# Patient Record
Sex: FEMALE | Employment: OTHER | URBAN - METROPOLITAN AREA
[De-identification: names, ages, dates, MRNs, and addresses within clinical notes are randomized per-mention and may not be internally consistent; named-entity substitution may affect disease eponyms.]

---

## 2024-03-04 ENCOUNTER — TRANSFERRED RECORDS (OUTPATIENT)
Dept: HEALTH INFORMATION MANAGEMENT | Facility: CLINIC | Age: 49
End: 2024-03-04

## 2024-08-19 ENCOUNTER — TRANSFERRED RECORDS (OUTPATIENT)
Dept: HEALTH INFORMATION MANAGEMENT | Facility: CLINIC | Age: 49
End: 2024-08-19

## 2024-08-20 ENCOUNTER — TRANSCRIBE ORDERS (OUTPATIENT)
Dept: OTHER | Age: 49
End: 2024-08-20

## 2024-08-20 DIAGNOSIS — R20.2 NUMBNESS AND TINGLING OF LEFT UPPER EXTREMITY: ICD-10-CM

## 2024-08-20 DIAGNOSIS — R20.2 FACIAL TINGLING: Primary | ICD-10-CM

## 2024-08-20 DIAGNOSIS — R20.0 NUMBNESS AND TINGLING OF LEFT UPPER EXTREMITY: ICD-10-CM

## 2024-09-04 ENCOUNTER — TELEPHONE (OUTPATIENT)
Dept: NEUROLOGY | Facility: CLINIC | Age: 49
End: 2024-09-04
Payer: COMMERCIAL

## 2024-09-04 NOTE — TELEPHONE ENCOUNTER
Message left for patient that the start time of appointment was moved by 30 mintues for October 31st with Dr. Robertson.  Patient will call back and confirm this message was received.    Patient appointment has already been adjusted- change was due to an error in the schedule.

## 2024-10-04 NOTE — CONFIDENTIAL NOTE
Reason for visit: Facial tingling     Numbness and tingling of left upper extremity    Referring Provider: Mara Crowder MBBS [facility]   Office Visit Notes: 08/19/2024         IMAGING   STATUS/LOCATION   DATE/TYPE   MRI/MRA [facility]- PACS 03/04/2024   CT/CTA N/A    LABS External - PACS    EEG     EMG     NEUOROPSYCH TEST:       NOTES:   STATUS/LOCATION   DATE/TYPE   Abdi Oquendo MD [facility]   [facility] [place]   01/30/2024, 03/08/2024

## 2024-10-31 ENCOUNTER — OFFICE VISIT (OUTPATIENT)
Dept: NEUROLOGY | Facility: CLINIC | Age: 49
End: 2024-10-31
Attending: INTERNAL MEDICINE
Payer: COMMERCIAL

## 2024-10-31 ENCOUNTER — PRE VISIT (OUTPATIENT)
Dept: NEUROLOGY | Facility: CLINIC | Age: 49
End: 2024-10-31

## 2024-10-31 VITALS
BODY MASS INDEX: 22.03 KG/M2 | HEART RATE: 83 BPM | HEIGHT: 66 IN | DIASTOLIC BLOOD PRESSURE: 74 MMHG | WEIGHT: 137.1 LBS | SYSTOLIC BLOOD PRESSURE: 111 MMHG | OXYGEN SATURATION: 100 %

## 2024-10-31 DIAGNOSIS — I63.9 CEREBROVASCULAR ACCIDENT (CVA), UNSPECIFIED MECHANISM (H): Primary | ICD-10-CM

## 2024-10-31 DIAGNOSIS — R20.2 NUMBNESS AND TINGLING OF LEFT UPPER EXTREMITY: ICD-10-CM

## 2024-10-31 DIAGNOSIS — R20.2 FACIAL TINGLING: ICD-10-CM

## 2024-10-31 DIAGNOSIS — R20.0 NUMBNESS AND TINGLING OF LEFT UPPER EXTREMITY: ICD-10-CM

## 2024-10-31 PROCEDURE — 99205 OFFICE O/P NEW HI 60 MIN: CPT | Performed by: PSYCHIATRY & NEUROLOGY

## 2024-10-31 RX ORDER — METFORMIN HYDROCHLORIDE 500 MG/1
500 TABLET, EXTENDED RELEASE ORAL
COMMUNITY
Start: 2024-08-19

## 2024-10-31 RX ORDER — ACETAMINOPHEN AND CODEINE PHOSPHATE 120; 12 MG/5ML; MG/5ML
0.35 SOLUTION ORAL DAILY
COMMUNITY
Start: 2023-11-06

## 2024-10-31 NOTE — PROGRESS NOTES
Neurology Consultation    Patient Name:  Chandra Lynn Reyer  MRN:  [id number]    :  1975  Date of Service:  2024  Primary care provider:  Mara Crowder        Chief Complaint: Facial numbness     History of Present Illness:     Chandra Lynn Reyer is a 49 year old right handed female who presents for evaluation of intermittent left sided numbness.     She got the Moderna booster in  and then immediately had numbness and tingling in the left side of her face and arm (received the booster in her left arm).  This then resolved but then started getting it intermittently. It got progressively worse over time and now has it most days.  It lasts a couple of minutes to 15 minutes on average.  She has not identified any provoking factor. Has not appreciated any particular time of day that it comes more often.  Feels this in the left side of her face, left arm, and left leg.  They don't always come in the same locations at the same time.  She appreciates more in her face though.  In her arm, she notices it from the elbow down to her wrist and in the leg more in the foot.  Denies associated pain or weakness.  Denies it limiting her activities.  Denies balance problems, falls, or dizziness associated. Has not identified any alleviating factors.  Symptoms are never on the right side. Thought massage therapy helped (per review of her PCP's note maybe 20-40%).       Denies miscarriages. Denies personal history of blood clots or TIAs.  Mom has had a TIAs. Maternal uncle passed away at age 60 - maybe had a blood clot - diabetes, transplants  - thought it was related to agent orange exposure. Has a history of ocular migraines - has been several years since she has had them.      Denies any significant events/episodes of neurologic episodes in the past.     Did not start ASA 81 mg.     Review of Records   - Was seen at the [facility]. Had MRI brain W/WO and MRI C-spine W/WO. Was recommended  "that she start ASA 81 mg for chronic infarcts.   - MRI brain W/WO was reviewed.  Official read:  No acute abnormality.  Chronic bilateral cerebellar lacunar infarcts.Non specific prominence of the supratentorial ventricles out of proportion to the sulci without periventricular hyperintensity to indicated acute hydrocephalus. 1.7 cm arachnoid cyst at the anterior left middle cranial fossa. Scattered foci of T2 prolongation throughout the subcortical white matter of both cerebral hemispheres mostly within the frontal lobes out of proportion to the patient's age.    - MRI C-spine W/WO: normal cervical spine signal. 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 Denies tobacco, EtOh use, and recreational drug use.     Allergies:  Not on File    Physical Exam:  /74   Pulse 83   Ht 1.684 m (5' 6.3\")   Wt 62.2 kg (137 lb 1.6 oz)   LMP  (Within Days)   SpO2 100%   BMI 21.93 kg/m      General:  No acute distress  Cardiovascular: Normal rate.  Lung: Respirations are non-labored.  Extremities: Normal range of motion  Integumentary: Warm and dry    Neurologic:  Mental status : alert, fund of knowledge appropriate for visit    LANGUAGE: Speech fluent, no dysarthria     CN:  II- pupils equal and reactive, visual fields full  III, IV, VI- extraocular movements intact  V- sensation intact bilaterally  VII- face symmetric  VIII- hearing intact, no nystagmus  IX, X- palate elevates symmetrically  XI- sternocleidomastoid 5/5  XII- tongue midline    MOTOR : intact bulk and tone  5/5 strength in all ext    SENSORY : intact to pp, temp, and vibration in BUE and BLE. Romberg negative      REFLEXES:       Right Left   Triceps 2+ 2+   Biceps 2+ 2+   Brachioradialis 2+ 2+   Knee jerk 2+ 2+   Ankle jerk 2+ 2+   Vaughn absent   Cross adductors absent "   Toes down going     CEREBELLUM: finger to nose, finger taps, and heel to shin intact bilaterally     GAIT : Largely normal ; able to do tandem gait     Cognition and Speech: Speech clear and coherent.    Psychiatric: Cooperative, Appropriate mood & affect.      Assessment/Plan:   Chandra Lynn Reyer is a 49 year old right handed female who presents for second opinion concerning intermittent left sided (face, arm, and leg) numbness.  Neurologic exam is largely unremarkable. Reviewed her MRI. Counseled patient that I did not see anything on her imaging,nor could I think of any neurologic disorder, that can readily explain her symptoms.  I am uncertain what further testing might provide an etiology. She does not really have pain associated so neuropathic pain agents unlikely to be that beneficial.      Her MRI does show a rather significant white matter disease burden, especially for her age and relatively few cardiovascular risk factors.  Also, surprisingly, she has evidence for bilateral chronic infarcts.  I do think further stroke workup is indicated especially with her age. Will obtain vascular imaging to start and will likely proceed with cardiac monitoring and TTE.  Discussed at length starting ASA 81 mg for secondary stroke prevention.  LDL 69 and A1c 6.3%.      Incidentally her MRI also shows ventriculomegaly.  No symptoms or exam findings suggestive of NPH.      Intermittent left sided numbness/tingling   Incidental bilateral cerebellar infarcts     Plan  > CTA H and N   > Start ASA 81 mg   > Zio patch and TTE pending CTA H and N  > Follow-up in 3 months      I spent 75 minutes providing care for this patient, including reviewing imaging, labs, and notes as well as providing counseling and coordination of care for the above issues.

## 2024-12-29 ENCOUNTER — HEALTH MAINTENANCE LETTER (OUTPATIENT)
Age: 49
End: 2024-12-29

## 2025-02-04 ENCOUNTER — TELEPHONE (OUTPATIENT)
Dept: NEUROLOGY | Facility: CLINIC | Age: 50
End: 2025-02-04
Payer: COMMERCIAL

## 2025-02-04 ENCOUNTER — HOSPITAL ENCOUNTER (OUTPATIENT)
Dept: CT IMAGING | Facility: CLINIC | Age: 50
Discharge: HOME | End: 2025-02-04
Attending: PSYCHIATRY & NEUROLOGY
Payer: COMMERCIAL

## 2025-02-04 DIAGNOSIS — I63.9 CEREBROVASCULAR ACCIDENT (CVA), UNSPECIFIED MECHANISM (H): ICD-10-CM

## 2025-02-04 LAB
CREAT BLD-MCNC: 0.6 MG/DL (ref 0.6–1.1)
EGFRCR SERPLBLD CKD-EPI 2021: >60 ML/MIN/1.73M2

## 2025-02-04 PROCEDURE — 70496 CT ANGIOGRAPHY HEAD: CPT

## 2025-02-04 PROCEDURE — 250N000011 HC RX IP 250 OP 636: Performed by: PSYCHIATRY & NEUROLOGY

## 2025-02-04 PROCEDURE — 82565 ASSAY OF CREATININE: CPT

## 2025-02-04 RX ORDER — IOPAMIDOL 755 MG/ML
75 INJECTION, SOLUTION INTRAVASCULAR ONCE
Status: COMPLETED | OUTPATIENT
Start: 2025-02-04 | End: 2025-02-04

## 2025-02-04 RX ADMIN — IOPAMIDOL 75 ML: 755 INJECTION, SOLUTION INTRAVENOUS at 14:29

## 2025-02-04 NOTE — TELEPHONE ENCOUNTER
Attempted to reach patient to remind them about appointment scheduled with Azalea Robertson DO on 2/5/25 in our [facility].    A voicemail was left with a call back number if the patient has questions or would like to reschedule.

## 2025-02-05 ENCOUNTER — OFFICE VISIT (OUTPATIENT)
Dept: NEUROLOGY | Facility: CLINIC | Age: 50
End: 2025-02-05
Payer: COMMERCIAL

## 2025-02-05 VITALS
SYSTOLIC BLOOD PRESSURE: 109 MMHG | DIASTOLIC BLOOD PRESSURE: 72 MMHG | OXYGEN SATURATION: 100 % | WEIGHT: 135.1 LBS | BODY MASS INDEX: 21.61 KG/M2 | HEART RATE: 77 BPM

## 2025-02-05 DIAGNOSIS — I67.1 INTRACRANIAL ANEURYSM: ICD-10-CM

## 2025-02-05 DIAGNOSIS — I63.9 CEREBROVASCULAR ACCIDENT (CVA), UNSPECIFIED MECHANISM (H): Primary | ICD-10-CM

## 2025-02-05 LAB — RADIOLOGIST FLAGS: ABNORMAL

## 2025-02-05 NOTE — LETTER
2025      Chandra L Reyer  [address] [place] [address]      Dear Colleague,    Thank you for referring your patient, Chandra L Reyer, to the [facility]. Please see a copy of my visit note below.      Neurology Consultation    Patient Name:  Chandra L Reyer  MRN:  [id number]    :  1975  Date of Service:  2025  Primary care provider:  Mara Crowder        Chief Complaint: Follow-up     History of Present Illness:     Chandra L Reyer is a 50 year old female who presents for routine follow-up.  Left sided numbness is largely the same.  She  has not started ASA.  Had questions about it.     Denies problems arthralgias, myalgias, swelling/erythematous joints, easy bruisability, easy dislocation of joints.     No family history of rheumatologic disorders that she is aware of. Denies family history of aneurysm.     Mom has diabetes type II, mom's side of the family has thyroid issues. Father passed away in his 50s from pulmonary scarring issues?  Paternal grandparents both passed away in 40s, maternal grandfather from a heart defect and grandmother from multiple myeloma    HEAD CT:  1.  No acute intracranial pathology.  2.  Small, chronic appearing infarction in the left cerebellar hemisphere     HEAD CTA:   1.  Multiple atypical appearing aneurysms regarding the anterior circulation as described. A referral to neuro interventional radiology for further workup is recommended. Additionally, given the number and appearance of these aneurysms, an underlying   connective tissue disease may explain the etiology. A referral to rheumatology is recommended as well.  2.  The left AICA is difficult to visualize past its proximal segment but may be thrombosed or is too diminutive to be visualized with a CT angiogram.     NECK CTA:  1.  Normal neck CTA.    Prior History from 10/31/2024:  She got the Moderna booster in  and then immediately had numbness and tingling  in the left side of her face and arm (received the booster in her left arm).  This then resolved but then started getting it intermittently. It got progressively worse over time and now has it most days.  It lasts a couple of minutes to 15 minutes on average.  She has not identified any provoking factor. Has not appreciated any particular time of day that it comes more often.  Feels this in the left side of her face, left arm, and left leg.  They don't always come in the same locations at the same time.  She appreciates more in her face though.  In her arm, she notices it from the elbow down to her wrist and in the leg more in the foot.  Denies associated pain or weakness.  Denies it limiting her activities.  Denies balance problems, falls, or dizziness associated. Has not identified any alleviating factors.  Symptoms are never on the right side. Thought massage therapy helped (per review of her PCP's note maybe 20-40%).        Denies miscarriages. Denies personal history of blood clots or TIAs.  Mom has had a TIAs. Maternal uncle passed away at age 60 - maybe had a blood clot - diabetes, transplants  - thought it was related to agent orange exposure. Has a history of ocular migraines - has been several years since she has had them.       Denies any significant events/episodes of neurologic episodes in the past.      Did not start ASA 81 mg.      Review of Records   - Was seen at the [facility]. Had MRI brain W/WO and MRI C-spine W/WO. Was recommended that she start ASA 81 mg for chronic infarcts.   - MRI brain W/WO was reviewed.  Official read:  No acute abnormality.  Chronic bilateral cerebellar lacunar infarcts.Non specific prominence of the supratentorial ventricles out of proportion to the sulci without periventricular hyperintensity to indicated acute hydrocephalus. 1.7 cm arachnoid cyst at the anterior left middle cranial fossa. Scattered foci of T2 prolongation throughout the subcortical  white matter of both cerebral hemispheres mostly within the frontal lobes out of proportion to the patient's age.    - MRI C-spine W/WO: normal cervical spine signal. At C5-6 mild intervertebral disc height loss and moderate right neural foraminal narrowing      Physical Exam:  /72   Pulse 77   Wt 61.3 kg (135 lb 1.6 oz)   SpO2 100%   BMI 21.61 kg/m    General:  No acute distress  Cardiovascular: Normal rate.  Lung: Respirations are non-labored.  Extremities: Normal range of motion  Integumentary: Warm and dry     Neurologic:  Mental status : alert, fund of knowledge appropriate for visit     LANGUAGE: Speech fluent, no dysarthria      CN:  II- pupils equal and reactive, visual fields full  III, IV, VI- extraocular movements intact  V- sensation intact bilaterally  VII- face symmetric  VIII- hearing intact, no nystagmus  IX, X- palate elevates symmetrically  XI- sternocleidomastoid 5/5  XII- tongue midline     MOTOR : intact bulk and tone  5/5 strength in all ext     SENSORY : intact to light touch in BUE and BLE      REFLEXES: Vaughn absent      CEREBELLUM: finger to nose, finger taps, and heel to shin intact bilaterally      GAIT : Largely normal      Cognition and Speech: Speech clear and coherent.     Psychiatric: Cooperative, Appropriate mood & affect.    Assessment/Plan:     Chandra L Reyer is a 50 year old year old female who presents for routine follow-up concerning intermittent left sided (face, arm, and leg) numbness.  Neurologic exam is largely unremarkable. Reviewed her MRIs. Counseled patient that I did not see anything on her imaging,nor could I think of any neurologic disorder, that can readily explain her symptoms.  I am uncertain what further testing might provide an etiology. She does not really have pain associated so neuropathic pain agents unlikely to be that beneficial.       Her MRI does show a rather significant white matter disease burden, especially for her age and relatively few  cardiovascular risk factors.  Also, surprisingly, she has evidence for bilateral chronic cerebellar infarcts.  Obtained CTA H and N for stroke workup which did not show significant LVO or stenosis; however, appreciated multiple intracranial aneurysms. Will refer to neuro IR for further evaluation and management. There was also some concern that the irregularity and amount of aneurysms was suggestive of a possible underlying connective tissue disorder. Nothing overt in patient's history is suggestive of this but think rheumatologic evaluation will be helpful. Will obtain TTE for stroke workup.  LDL 69 and A1c 6.3%.  Recommended ASA 81 mg for secondary stroke prevention.      Intermittent left sided numbness/tingling   Incidental bilateral cerebellar infarcts   Incidental multiple intracranial aneurysms      Plan  > Refer to neuro IR  > Refer to rheumatology   > TTE with bubble   > Start ASA 81 mg daily   > LDL at goal (40-70)  > Follow-up in 3 months      I spent 53 minutes providing care for this patient, including reviewing imaging, labs, and notes as well as providing counseling and coordination of care for the above issues.      Again, thank you for allowing me to participate in the care of your patient.        Sincerely,        Azalea Robertson, DO    Electronically signed

## 2025-02-05 NOTE — PATIENT INSTRUCTIONS
- I would like you to see the neuro interventional radiology for further evaluation of the aneurysms we found on your CT scan   - I'd also like you to see the rheumatologist to evaluate you for a connective tissue disorder that might predispose you to forming these aneurysms  - I have placed an order for an echocardiogram (ultrasound of the heart) for continued workup of the incidental strokes we saw on your MRI brain   - You should be called to schedule all these tests/referrals

## 2025-02-05 NOTE — PROGRESS NOTES
Neurology Consultation    Patient Name:  Chandra L Reyer  MRN:  [id number]    :  1975  Date of Service:  2025  Primary care provider:  Mara Crowder        Chief Complaint: Follow-up     History of Present Illness:     Chandra L Reyer is a 50 year old female who presents for routine follow-up.  Left sided numbness is largely the same.  She  has not started ASA.  Had questions about it.     Denies problems arthralgias, myalgias, swelling/erythematous joints, easy bruisability, easy dislocation of joints.     No family history of rheumatologic disorders that she is aware of. Denies family history of aneurysm.     Mom has diabetes type II, mom's side of the family has thyroid issues. Father passed away in his 50s from pulmonary scarring issues?  Paternal grandparents both passed away in 40s, maternal grandfather from a heart defect and grandmother from multiple myeloma    HEAD CT:  1.  No acute intracranial pathology.  2.  Small, chronic appearing infarction in the left cerebellar hemisphere     HEAD CTA:   1.  Multiple atypical appearing aneurysms regarding the anterior circulation as described. A referral to neuro interventional radiology for further workup is recommended. Additionally, given the number and appearance of these aneurysms, an underlying   connective tissue disease may explain the etiology. A referral to rheumatology is recommended as well.  2.  The left AICA is difficult to visualize past its proximal segment but may be thrombosed or is too diminutive to be visualized with a CT angiogram.     NECK CTA:  1.  Normal neck CTA.    Prior History from 10/31/2024:  She got the Moderna booster in  and then immediately had numbness and tingling in the left side of her face and arm (received the booster in her left arm).  This then resolved but then started getting it intermittently. It got progressively worse over time and now has it most days.  It lasts a couple of minutes to 15  minutes on average.  She has not identified any provoking factor. Has not appreciated any particular time of day that it comes more often.  Feels this in the left side of her face, left arm, and left leg.  They don't always come in the same locations at the same time.  She appreciates more in her face though.  In her arm, she notices it from the elbow down to her wrist and in the leg more in the foot.  Denies associated pain or weakness.  Denies it limiting her activities.  Denies balance problems, falls, or dizziness associated. Has not identified any alleviating factors.  Symptoms are never on the right side. Thought massage therapy helped (per review of her PCP's note maybe 20-40%).        Denies miscarriages. Denies personal history of blood clots or TIAs.  Mom has had a TIAs. Maternal uncle passed away at age 60 - maybe had a blood clot - diabetes, transplants  - thought it was related to agent orange exposure. Has a history of ocular migraines - has been several years since she has had them.       Denies any significant events/episodes of neurologic episodes in the past.      Did not start ASA 81 mg.      Review of Records   - Was seen at the [facility]. Had MRI brain W/WO and MRI C-spine W/WO. Was recommended that she start ASA 81 mg for chronic infarcts.   - MRI brain W/WO was reviewed.  Official read:  No acute abnormality.  Chronic bilateral cerebellar lacunar infarcts.Non specific prominence of the supratentorial ventricles out of proportion to the sulci without periventricular hyperintensity to indicated acute hydrocephalus. 1.7 cm arachnoid cyst at the anterior left middle cranial fossa. Scattered foci of T2 prolongation throughout the subcortical white matter of both cerebral hemispheres mostly within the frontal lobes out of proportion to the patient's age.    - MRI C-spine W/WO: normal cervical spine signal. At C5-6 mild intervertebral disc height loss and moderate right neural  foraminal narrowing      Physical Exam:  /72   Pulse 77   Wt 61.3 kg (135 lb 1.6 oz)   SpO2 100%   BMI 21.61 kg/m    General:  No acute distress  Cardiovascular: Normal rate.  Lung: Respirations are non-labored.  Extremities: Normal range of motion  Integumentary: Warm and dry     Neurologic:  Mental status : alert, fund of knowledge appropriate for visit     LANGUAGE: Speech fluent, no dysarthria      CN:  II- pupils equal and reactive, visual fields full  III, IV, VI- extraocular movements intact  V- sensation intact bilaterally  VII- face symmetric  VIII- hearing intact, no nystagmus  IX, X- palate elevates symmetrically  XI- sternocleidomastoid 5/5  XII- tongue midline     MOTOR : intact bulk and tone  5/5 strength in all ext     SENSORY : intact to light touch in BUE and BLE      REFLEXES: Vaughn absent      CEREBELLUM: finger to nose, finger taps, and heel to shin intact bilaterally      GAIT : Largely normal      Cognition and Speech: Speech clear and coherent.     Psychiatric: Cooperative, Appropriate mood & affect.    Assessment/Plan:     Chandra L Reyer is a 50 year old year old female who presents for routine follow-up concerning intermittent left sided (face, arm, and leg) numbness.  Neurologic exam is largely unremarkable. Reviewed her MRIs. Counseled patient that I did not see anything on her imaging,nor could I think of any neurologic disorder, that can readily explain her symptoms.  I am uncertain what further testing might provide an etiology. She does not really have pain associated so neuropathic pain agents unlikely to be that beneficial.       Her MRI does show a rather significant white matter disease burden, especially for her age and relatively few cardiovascular risk factors.  Also, surprisingly, she has evidence for bilateral chronic cerebellar infarcts.  Obtained CTA H and N for stroke workup which did not show significant LVO or stenosis; however, appreciated multiple  intracranial aneurysms. Will refer to neuro IR for further evaluation and management. There was also some concern that the irregularity and amount of aneurysms was suggestive of a possible underlying connective tissue disorder. Nothing overt in patient's history is suggestive of this but think rheumatologic evaluation will be helpful. Will obtain TTE for stroke workup.  LDL 69 and A1c 6.3%.  Recommended ASA 81 mg for secondary stroke prevention.      Intermittent left sided numbness/tingling   Incidental bilateral cerebellar infarcts   Incidental multiple intracranial aneurysms      Plan  > Refer to neuro IR  > Refer to rheumatology   > TTE with bubble   > Start ASA 81 mg daily   > LDL at goal (40-70)  > Follow-up in 3 months      I spent 53 minutes providing care for this patient, including reviewing imaging, labs, and notes as well as providing counseling and coordination of care for the above issues.

## 2025-02-07 ENCOUNTER — OFFICE VISIT (OUTPATIENT)
Dept: RHEUMATOLOGY | Facility: CLINIC | Age: 50
End: 2025-02-07
Attending: STUDENT IN AN ORGANIZED HEALTH CARE EDUCATION/TRAINING PROGRAM
Payer: COMMERCIAL

## 2025-02-07 ENCOUNTER — LAB (OUTPATIENT)
Dept: LAB | Facility: CLINIC | Age: 50
End: 2025-02-07
Payer: COMMERCIAL

## 2025-02-07 VITALS
BODY MASS INDEX: 21.35 KG/M2 | OXYGEN SATURATION: 100 % | DIASTOLIC BLOOD PRESSURE: 79 MMHG | HEART RATE: 90 BPM | SYSTOLIC BLOOD PRESSURE: 122 MMHG | WEIGHT: 133.5 LBS

## 2025-02-07 DIAGNOSIS — I73.00 RAYNAUD'S DISEASE WITHOUT GANGRENE: ICD-10-CM

## 2025-02-07 DIAGNOSIS — I73.00 RAYNAUD'S DISEASE WITHOUT GANGRENE: Primary | ICD-10-CM

## 2025-02-07 DIAGNOSIS — I67.1 INTRACRANIAL ANEURYSM: ICD-10-CM

## 2025-02-07 LAB
ALBUMIN MFR UR ELPH: 6.6 MG/DL
CREAT UR-MCNC: 95.1 MG/DL
CRP SERPL-MCNC: <3 MG/L
ERYTHROCYTE [SEDIMENTATION RATE] IN BLOOD BY WESTERGREN METHOD: 20 MM/HR (ref 0–30)
PROT/CREAT 24H UR: 0.07 MG/MG CR (ref 0–0.2)

## 2025-02-07 PROCEDURE — 86146 BETA-2 GLYCOPROTEIN ANTIBODY: CPT

## 2025-02-07 PROCEDURE — 86039 ANTINUCLEAR ANTIBODIES (ANA): CPT

## 2025-02-07 PROCEDURE — 83516 IMMUNOASSAY NONANTIBODY: CPT

## 2025-02-07 PROCEDURE — 84156 ASSAY OF PROTEIN URINE: CPT

## 2025-02-07 PROCEDURE — 86235 NUCLEAR ANTIGEN ANTIBODY: CPT

## 2025-02-07 PROCEDURE — 99213 OFFICE O/P EST LOW 20 MIN: CPT | Performed by: STUDENT IN AN ORGANIZED HEALTH CARE EDUCATION/TRAINING PROGRAM

## 2025-02-07 PROCEDURE — 82595 ASSAY OF CRYOGLOBULIN: CPT

## 2025-02-07 PROCEDURE — 86147 CARDIOLIPIN ANTIBODY EA IG: CPT

## 2025-02-07 PROCEDURE — 86235 NUCLEAR ANTIGEN ANTIBODY: CPT | Mod: 59

## 2025-02-07 PROCEDURE — 86225 DNA ANTIBODY NATIVE: CPT

## 2025-02-07 PROCEDURE — 99205 OFFICE O/P NEW HI 60 MIN: CPT | Performed by: STUDENT IN AN ORGANIZED HEALTH CARE EDUCATION/TRAINING PROGRAM

## 2025-02-07 PROCEDURE — 85613 RUSSELL VIPER VENOM DILUTED: CPT

## 2025-02-07 PROCEDURE — 85652 RBC SED RATE AUTOMATED: CPT

## 2025-02-07 PROCEDURE — 85390 FIBRINOLYSINS SCREEN I&R: CPT | Performed by: PATHOLOGY

## 2025-02-07 PROCEDURE — 86036 ANCA SCREEN EACH ANTIBODY: CPT

## 2025-02-07 PROCEDURE — 83876 ASSAY MYELOPEROXIDASE: CPT

## 2025-02-07 PROCEDURE — 85730 THROMBOPLASTIN TIME PARTIAL: CPT

## 2025-02-07 PROCEDURE — 86140 C-REACTIVE PROTEIN: CPT

## 2025-02-07 PROCEDURE — 86038 ANTINUCLEAR ANTIBODIES: CPT

## 2025-02-07 PROCEDURE — 86160 COMPLEMENT ANTIGEN: CPT

## 2025-02-07 PROCEDURE — G2211 COMPLEX E/M VISIT ADD ON: HCPCS | Performed by: STUDENT IN AN ORGANIZED HEALTH CARE EDUCATION/TRAINING PROGRAM

## 2025-02-07 ASSESSMENT — PAIN SCALES - GENERAL: PAINLEVEL_OUTOF10: NO PAIN (0)

## 2025-02-07 NOTE — PROGRESS NOTES
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  Comment: Findings are concerning for a possible vasculitis/vasculopathy. The remainder of her ROS is unremarkable except for some recurrent sinusitis and nasal polyps (no eosinophila or asthma to suggest EGPA, no other ENT symptoms, saddle nose,  respiratory symptoms, or renal dysfunction to suggest GPA). No constitutional symptoms. No signs or symptoms besides raynaud's to suggest scleroderma or SLE.       Plan:  -will obtain additional serologies as below  -CTA C/A/P      Orders Placed This Encounter   Procedures    CTA Chest Abdomen Pelvis w Contrast    ANCA IgG by IFA with Reflex to Titer    Complement C3    Complement C4    Cryoglobulin identification    Myeloperoxidase Antibody IgG    Proteinase 3 Antibody IgG    Anti Nuclear Sunni IgG by IFA with Reflex    Beta 2 Glycoprotein Antibodies IGG IGM    Cardiolipin Sunni IgG and IgM    Lupus Anticoagulant Panel    Erythrocyte sedimentation rate auto    CRP inflammation    Protein  random urine    DNA double stranded antibodies    YARA antibody panel    Scleroderma Antibody Scl70 YARA IgG    Centromere Antibody IgG    Mirna 1 Antibody IgG      The longitudinal plan of care for the diagnosis(es)/condition(s) as documented were addressed during this visit. Due to the added complexity in care, I will continue to support Hiram in the subsequent management and with ongoing continuity of care.    60 minutes spent on the day of the encounter doing chart review, history and exam, counseling and documentation.     Subjective         HPI    Patient presents for evaluation of possible underlying  autoimmune disorder to explain some neurological symptoms and abnormalities on brain imaging.    She reports that shortly after getting the Moderna booster (same day ) in December of 2021 she developed numbness and tingling over the left side of her face.  This way after a few hours but kept recurring and becoming more frequent and spread to include numbness and tingling over her left arm and sometimes over her left leg specifically over the knee and ankle.  These episodes became more frequent over the following year until they became daily in nature.  She reports they have not gotten any better or worse since.  No associated weakness.    Per review of most recent neurology note few days ago, neurological exam was largely unremarkable and per review of MRIs nothing was seen that could explain her symptoms.  CTA head and neck was done and was normal.  It was not felt that any known neurological disorder could explain her symptoms.  However, given the increased white matter disease burden significant for her age with relatively few cardiovascular risk factors as well as bilateral chronic cerebellar infarcts she was referred to neuro IR.  There is also some concern for irregularity and aneurysms of the vasculature and so she was referred to rheumatology for further workup of possible vascular disease related to systemic autoimmune disease.  She was started on aspirin 81 mg for secondary stroke prevention.    She will be getting an echocardiogram in the coming weeks.    Reports new Raynaud's which started last month. Has happened about 4 times after touching something cold. Had a few fingers that turned white for about 5-10 minutes.   No GERD, no dysphagia, has intermittent diarrhea and constipation, some bloating, no blood in stool.   No skin thickening or thightening.   No lumps or bumps.     No asthma, no nasal polyps.     Has a history of allergies and recurrent sinusitis since a teenager. No sinus procedures.  Doesn't usually get treated with antibiotics or steroids. No recurrent ear infections. Does get some ear fullness which she attributes to allergies, no hearing loss, no recurrent throat pain.   No recurrent unexplained fevers, but does get elevated temps to 99 with her sinus issues, no unintentional weight loss, no pain with breathing, no cough no SOB.  No rashes, no photosensitivity   No raynaud's, no oral or nasal ulcers,  No salivary gland swelling, no dry eyes or dry mouth except with allergies     No hx of inflammatory eye disease, no rashes,   no joint pain or swelling,   no hx of blood clots or miscarriages, (One pregnancy)  no muscle pain or weakness     No family  Hx of known autoimmune disease  Not a previous smoker. Not a current smoker.     Maternal grandmother with possible RA  Mom with DM2  Sister with thyroid disease    Lab and Imaging review:    I reviewed recent labs and imaging including:    EXAM: CTA HEAD NECK W CONTRAST  DATE: 2/4/2025     INDICATION: Incidental cerebellar strokes, stroke work up     COMPARISON: 03/04/2024        FINDINGS:      NONCONTRAST HEAD CT:   INTRACRANIAL CONTENTS: No intracranial hemorrhage, extraaxial collection, or mass effect.  No CT evidence of acute infarct. Chronic appearing small infarction within the superior aspect of the left cerebellar hemisphere Mild presumed chronic small vessel   ischemic changes. Given the patient's age, there is enlargement of the supratentorial ventricular system is disproportionate to the degree of volume loss. Correlate for normal pressure hydrocephalus.      VISUALIZED ORBITS/SINUSES/MASTOIDS: No intraorbital abnormality. Mild mucosal thickening scattered about the paranasal sinuses. No middle ear or mastoid effusion.     BONES/SOFT TISSUES: No acute abnormality.     HEAD CTA:     ANTERIOR CIRCULATION:      Multiple aneurysms arising from the intracranial internal carotid arteries as follows:     - Saccular type aneurysm arising from  the lateral aspect of the right cavernous internal carotid artery measuring approximately 2 mm (series 11, image 372).     - Saccular type aneurysm arising from the medial aspect of the right ophthalmic segment internal carotid artery with the neck measuring approximately 1.3 mm and the largest short axis diameter of the aneurysm itself measuring 3.1 mm (series 11, image   372).     -Anteriorly oriented aneurysm in close proximity to the takeoff of the left ophthalmic artery measuring 3.5 x 3.4 mm (series 11, 3 image 386)      Incidental anterior cerebral artery trifurcation. No large vessel occlusion or hemodynamically significant stenosis.     POSTERIOR CIRCULATION: Questionable though likely fenestration of the basilar artery (series 11, image 381). Balanced vertebral arteries supply a normal basilar artery. Diminutive AICA which is unable to be traced past its very proximal aspect (series   11, image 359-360).     DURAL VENOUS SINUSES: Expected enhancement of the major dural venous sinuses.     NECK CTA:     RIGHT CAROTID: No measurable stenosis or dissection.     LEFT CAROTID: No measurable stenosis or dissection.     VERTEBRAL ARTERIES: No focal stenosis or dissection. Balanced vertebral arteries.     AORTIC ARCH: Classic aortic arch anatomy with no significant stenosis at the origin of the great vessels.     NONVASCULAR STRUCTURES: Unremarkable.                                                                      IMPRESSION:   HEAD CT:  1.  No acute intracranial pathology.  2.  Small, chronic appearing infarction in the left cerebellar hemisphere     HEAD CTA:   1.  Multiple atypical appearing aneurysms regarding the anterior circulation as described. A referral to neuro interventional radiology for further workup is recommended. Additionally, given the number and appearance of these aneurysms, an underlying   connective tissue disease may explain the etiology. A referral to rheumatology is recommended as  well.  2.  The left AICA is difficult to visualize past its proximal segment but may be thrombosed or is too diminutive to be visualized with a CT angiogram.    MRI brain 3/4/2024          CTA head and neck 2/4/2025  MPRESSION:   HEAD CT:  1.  No acute intracranial pathology.  2.  Small, chronic appearing infarction in the left cerebellar hemisphere     HEAD CTA:   1.  Multiple atypical appearing aneurysms regarding the anterior circulation as described. A referral to neuro interventional radiology for further workup is recommended. Additionally, given the number and appearance of these aneurysms, an underlying   connective tissue disease may explain the etiology. A referral to rheumatology is recommended as well.  2.  The left AICA is difficult to visualize past its proximal segment but may be thrombosed or is too diminutive to be visualized with a CT angiogram.     NECK CTA:  1.  Normal neck CTA.    MRI cervical spine 3/4/2024    Normal signal intensity of the cervical spinal cord.  At C5-C6 mild intervertebral disc height loss and moderate right neural foraminal narrowing        Current Outpatient Medications:     loratadine-pseudoePHEDrine (CLARITIN-D 24-HOUR)  MG 24 hr tablet, Take 1 tablet by mouth daily., Disp: , Rfl:     metFORMIN (GLUCOPHAGE XR) 500 MG 24 hr tablet, Take 500 mg by mouth daily (with dinner)., Disp: , Rfl:     norethindrone (MICRONOR) 0.35 MG tablet, Take 0.35 mg by mouth daily., Disp: , Rfl:   Allergies:  No Known Allergies  Medical Hx:  No past medical history on file.  Surgical Hx:  No past surgical history on file.  Family Hx:  No family history on file.  Social Hx:  Social History     Tobacco Use    Smoking status: Never    Smokeless tobacco: Never   Substance Use Topics    Alcohol use: Not Currently        Objective   Physical Exam   /79 (BP Location: Right arm, Patient Position: Sitting, Cuff Size: Adult Large)   Pulse 90   Wt 60.6 kg (133 lb 8 oz)   SpO2 100%   BMI  21.35 kg/m    General: alert, well appearing, no distress  HEENT: no alopecia, clear conjunctiva, no saddlenose deformity, no oral or nasal ulcers, no cervical lymphadenopathy  Cardiac: normal rate and rhythm, no murmur, rubs or gallops   Pulm: normal respiratory effort, clear to auscultation bilaterally   MSK: Hand, wrist, elbow, and shoulder joints without evidence of synovitis or effusion. Intact and nonpainful range of motion. /ankle/knee/hip joints without erythema/effusion/tenderness to palpation and with intact nonpainful range of motion.   Skin: + Periungual erythema with dilated capillary loops, grossly visible.  Fingers cool to the touch. No nail pitting, digital ulceration, or telangiectasias. No subcutaneous nodules.  No Gutron's papules.  Dry skin over elbows.  No visible rashes.           Lisha Cevallos MD  Rheumatology

## 2025-02-07 NOTE — NURSING NOTE
Chief Complaint   Patient presents with    Consult     NEW PATIENT. Dx: Incidental finding on CTA Head of multiple irregular intracranial aneurysms seen, suggesting possible connective tissue disorder, referred by Azalea Robertson DO, per pt, MR in [facility].     /79 (BP Location: Right arm, Patient Position: Sitting, Cuff Size: Adult Large)   Pulse 90   Wt 60.6 kg (133 lb 8 oz)   SpO2 100%   BMI 21.35 kg/m

## 2025-02-07 NOTE — LETTER
2/7/2025       RE: Chandra L Reyer  [address] [place] [address]     Dear Colleague,    Thank you for referring your patient, Chandra L Reyer, to the [facility] RHEUMATOLOGY at [facility]. Please see a copy of my visit note below.    NEW PATIENT RHEUMATOLOGY VISIT     Assessment & Plan    Problem List    DMII  Alopecia accreta   Seasonal allergies       New onset Raynaud's with dilated capillary loops on exam   CTA Head with multiple aneurysms arising from the intracranial internal carotid arteries   MRI brain with scattered foci of hyperintensity within the frontal subcortical white matter over both hemispheres.  Comment: Findings are concerning for a possible vasculitis/vasculopathy. The remainder of her ROS is unremarkable except for some recurrent sinusitis and nasal polyps (no eosinophila or asthma to suggest EGPA, no other ENT symptoms, saddle nose,  respiratory symptoms, or renal dysfunction to suggest GPA). No constitutional symptoms. No signs or symptoms besides raynaud's to suggest scleroderma or SLE.       Plan:  -will obtain additional serologies as below  -CTA C/A/P      Orders Placed This Encounter   Procedures     CTA Chest Abdomen Pelvis w Contrast     ANCA IgG by IFA with Reflex to Titer     Complement C3     Complement C4     Cryoglobulin identification     Myeloperoxidase Antibody IgG     Proteinase 3 Antibody IgG     Anti Nuclear Sunni IgG by IFA with Reflex     Beta 2 Glycoprotein Antibodies IGG IGM     Cardiolipin Sunni IgG and IgM     Lupus Anticoagulant Panel     Erythrocyte sedimentation rate auto     CRP inflammation     Protein  random urine     DNA double stranded antibodies     YARA antibody panel     Scleroderma Antibody Scl70 YARA IgG     Centromere Antibody IgG     Mirna 1 Antibody IgG      The longitudinal plan of care for the diagnosis(es)/condition(s) as documented were addressed during this visit. Due to the added  complexity in care, I will continue to support Hiram in the subsequent management and with ongoing continuity of care.    60 minutes spent on the day of the encounter doing chart review, history and exam, counseling and documentation.     Subjective        HPI    Patient presents for evaluation of possible underlying autoimmune disorder to explain some neurological symptoms and abnormalities on brain imaging.    She reports that shortly after getting the Moderna booster (same day ) in December of 2021 she developed numbness and tingling over the left side of her face.  This way after a few hours but kept recurring and becoming more frequent and spread to include numbness and tingling over her left arm and sometimes over her left leg specifically over the knee and ankle.  These episodes became more frequent over the following year until they became daily in nature.  She reports they have not gotten any better or worse since.  No associated weakness.    Per review of most recent neurology note few days ago, neurological exam was largely unremarkable and per review of MRIs nothing was seen that could explain her symptoms.  CTA head and neck was done and was normal.  It was not felt that any known neurological disorder could explain her symptoms.  However, given the increased white matter disease burden significant for her age with relatively few cardiovascular risk factors as well as bilateral chronic cerebellar infarcts she was referred to neuro IR.  There is also some concern for irregularity and aneurysms of the vasculature and so she was referred to rheumatology for further workup of possible vascular disease related to systemic autoimmune disease.  She was started on aspirin 81 mg for secondary stroke prevention.    She will be getting an echocardiogram in the coming weeks.    Reports new Raynaud's which started last month. Has happened about 4 times after touching something cold. Had a few fingers that turned  white for about 5-10 minutes.   No GERD, no dysphagia, has intermittent diarrhea and constipation, some bloating, no blood in stool.   No skin thickening or thightening.   No lumps or bumps.     No asthma, no nasal polyps.     Has a history of allergies and recurrent sinusitis since a teenager. No sinus procedures. Doesn't usually get treated with antibiotics or steroids. No recurrent ear infections. Does get some ear fullness which she attributes to allergies, no hearing loss, no recurrent throat pain.   No recurrent unexplained fevers, but does get elevated temps to 99 with her sinus issues, no unintentional weight loss, no pain with breathing, no cough no SOB.  No rashes, no photosensitivity   No raynaud's, no oral or nasal ulcers,  No salivary gland swelling, no dry eyes or dry mouth except with allergies     No hx of inflammatory eye disease, no rashes,   no joint pain or swelling,   no hx of blood clots or miscarriages, (One pregnancy)  no muscle pain or weakness     No family  Hx of known autoimmune disease  Not a previous smoker. Not a current smoker.     Maternal grandmother with possible RA  Mom with DM2  Sister with thyroid disease    Lab and Imaging review:    I reviewed recent labs and imaging including:    EXAM: CTA HEAD NECK W CONTRAST  DATE: 2/4/2025     INDICATION: Incidental cerebellar strokes, stroke work up     COMPARISON: 03/04/2024        FINDINGS:      NONCONTRAST HEAD CT:   INTRACRANIAL CONTENTS: No intracranial hemorrhage, extraaxial collection, or mass effect.  No CT evidence of acute infarct. Chronic appearing small infarction within the superior aspect of the left cerebellar hemisphere Mild presumed chronic small vessel   ischemic changes. Given the patient's age, there is enlargement of the supratentorial ventricular system is disproportionate to the degree of volume loss. Correlate for normal pressure hydrocephalus.      VISUALIZED ORBITS/SINUSES/MASTOIDS: No intraorbital abnormality.  Mild mucosal thickening scattered about the paranasal sinuses. No middle ear or mastoid effusion.     BONES/SOFT TISSUES: No acute abnormality.     HEAD CTA:     ANTERIOR CIRCULATION:      Multiple aneurysms arising from the intracranial internal carotid arteries as follows:     - Saccular type aneurysm arising from the lateral aspect of the right cavernous internal carotid artery measuring approximately 2 mm (series 11, image 372).     - Saccular type aneurysm arising from the medial aspect of the right ophthalmic segment internal carotid artery with the neck measuring approximately 1.3 mm and the largest short axis diameter of the aneurysm itself measuring 3.1 mm (series 11, image   372).     -Anteriorly oriented aneurysm in close proximity to the takeoff of the left ophthalmic artery measuring 3.5 x 3.4 mm (series 11, 3 image 386)      Incidental anterior cerebral artery trifurcation. No large vessel occlusion or hemodynamically significant stenosis.     POSTERIOR CIRCULATION: Questionable though likely fenestration of the basilar artery (series 11, image 381). Balanced vertebral arteries supply a normal basilar artery. Diminutive AICA which is unable to be traced past its very proximal aspect (series   11, image 359-360).     DURAL VENOUS SINUSES: Expected enhancement of the major dural venous sinuses.     NECK CTA:     RIGHT CAROTID: No measurable stenosis or dissection.     LEFT CAROTID: No measurable stenosis or dissection.     VERTEBRAL ARTERIES: No focal stenosis or dissection. Balanced vertebral arteries.     AORTIC ARCH: Classic aortic arch anatomy with no significant stenosis at the origin of the great vessels.     NONVASCULAR STRUCTURES: Unremarkable.                                                                      IMPRESSION:   HEAD CT:  1.  No acute intracranial pathology.  2.  Small, chronic appearing infarction in the left cerebellar hemisphere     HEAD CTA:   1.  Multiple atypical appearing  aneurysms regarding the anterior circulation as described. A referral to neuro interventional radiology for further workup is recommended. Additionally, given the number and appearance of these aneurysms, an underlying   connective tissue disease may explain the etiology. A referral to rheumatology is recommended as well.  2.  The left AICA is difficult to visualize past its proximal segment but may be thrombosed or is too diminutive to be visualized with a CT angiogram.    MRI brain 3/4/2024          CTA head and neck 2/4/2025  MPRESSION:   HEAD CT:  1.  No acute intracranial pathology.  2.  Small, chronic appearing infarction in the left cerebellar hemisphere     HEAD CTA:   1.  Multiple atypical appearing aneurysms regarding the anterior circulation as described. A referral to neuro interventional radiology for further workup is recommended. Additionally, given the number and appearance of these aneurysms, an underlying   connective tissue disease may explain the etiology. A referral to rheumatology is recommended as well.  2.  The left AICA is difficult to visualize past its proximal segment but may be thrombosed or is too diminutive to be visualized with a CT angiogram.     NECK CTA:  1.  Normal neck CTA.    MRI cervical spine 3/4/2024    Normal signal intensity of the cervical spinal cord.  At C5-C6 mild intervertebral disc height loss and moderate right neural foraminal narrowing        Current Outpatient Medications:      loratadine-pseudoePHEDrine (CLARITIN-D 24-HOUR)  MG 24 hr tablet, Take 1 tablet by mouth daily., Disp: , Rfl:      metFORMIN (GLUCOPHAGE XR) 500 MG 24 hr tablet, Take 500 mg by mouth daily (with dinner)., Disp: , Rfl:      norethindrone (MICRONOR) 0.35 MG tablet, Take 0.35 mg by mouth daily., Disp: , Rfl:   Allergies:  No Known Allergies  Medical Hx:  No past medical history on file.  Surgical Hx:  No past surgical history on file.  Family Hx:  No family history on file.  Social  Hx:  Social History     Tobacco Use     Smoking status: Never     Smokeless tobacco: Never   Substance Use Topics     Alcohol use: Not Currently        Objective  Physical Exam   /79 (BP Location: Right arm, Patient Position: Sitting, Cuff Size: Adult Large)   Pulse 90   Wt 60.6 kg (133 lb 8 oz)   SpO2 100%   BMI 21.35 kg/m    General: alert, well appearing, no distress  HEENT: no alopecia, clear conjunctiva, no saddlenose deformity, no oral or nasal ulcers, no cervical lymphadenopathy  Cardiac: normal rate and rhythm, no murmur, rubs or gallops   Pulm: normal respiratory effort, clear to auscultation bilaterally   MSK: Hand, wrist, elbow, and shoulder joints without evidence of synovitis or effusion. Intact and nonpainful range of motion. /ankle/knee/hip joints without erythema/effusion/tenderness to palpation and with intact nonpainful range of motion.   Skin: + Periungual erythema with dilated capillary loops, grossly visible.  Fingers cool to the touch. No nail pitting, digital ulceration, or telangiectasias. No subcutaneous nodules.  No Gutron's papules.  Dry skin over elbows.  No visible rashes.           Lisha Cevallos MD  Rheumatology       Again, thank you for allowing me to participate in the care of your patient.      Sincerely,    Lisha Cevallos MD

## 2025-02-10 LAB
B2 GLYCOPROT1 IGG SERPL IA-ACNC: 1 U/ML
B2 GLYCOPROT1 IGM SERPL IA-ACNC: <2.4 U/ML
C3 SERPL-MCNC: 118 MG/DL (ref 81–157)
C4 SERPL-MCNC: 29 MG/DL (ref 13–39)
CARDIOLIPIN IGG SER IA-ACNC: <2 GPL-U/ML
CARDIOLIPIN IGG SER IA-ACNC: NEGATIVE
CARDIOLIPIN IGM SER IA-ACNC: 2.1 MPL-U/ML
CARDIOLIPIN IGM SER IA-ACNC: NEGATIVE
DRVVT SCREEN RATIO: 0.87
ENA SM IGG SER IA-ACNC: <0.7 U/ML
ENA SM IGG SER IA-ACNC: NEGATIVE
ENA SS-A AB SER IA-ACNC: <0.5 U/ML
ENA SS-A AB SER IA-ACNC: NEGATIVE
ENA SS-B IGG SER IA-ACNC: <0.6 U/ML
ENA SS-B IGG SER IA-ACNC: NEGATIVE
INR PPP: 1.01 (ref 0.85–1.15)
LA PPP-IMP: NEGATIVE
LOCATION OF TASK: NORMAL
LUPUS INTERPRETATION: NORMAL
PTT RATIO: 1.14
THROMBIN TIME: 17.4 SECONDS (ref 13–19)
U1 SNRNP IGG SER IA-ACNC: 1.7 U/ML
U1 SNRNP IGG SER IA-ACNC: NEGATIVE

## 2025-02-11 LAB
ANA PAT SER IF-IMP: ABNORMAL
ANA SER QL IF: POSITIVE
ANA TITR SER IF: ABNORMAL {TITER}
ANCA AB PATTERN SER IF-IMP: NORMAL
C-ANCA TITR SER IF: NORMAL {TITER}
CENTROMERE B AB SER-ACNC: <0.7 U/ML
CENTROMERE B AB SER-ACNC: NEGATIVE
DSDNA AB SER-ACNC: 2.5 IU/ML
ENA JO1 AB SER IA-ACNC: <0.5 U/ML
ENA JO1 IGG SER-ACNC: NEGATIVE
ENA SCL70 IGG SER IA-ACNC: <0.6 U/ML
ENA SCL70 IGG SER IA-ACNC: NEGATIVE
MYELOPEROXIDASE AB SER IA-ACNC: <0.3 U/ML
MYELOPEROXIDASE AB SER IA-ACNC: NEGATIVE
PROTEINASE3 AB SER IA-ACNC: <1 U/ML
PROTEINASE3 AB SER IA-ACNC: NEGATIVE

## 2025-02-12 LAB — CRYOGLOB SER QL: NEGATIVE

## 2025-02-17 ENCOUNTER — HOSPITAL ENCOUNTER (OUTPATIENT)
Dept: CT IMAGING | Facility: CLINIC | Age: 50
Discharge: HOME | End: 2025-02-17
Attending: STUDENT IN AN ORGANIZED HEALTH CARE EDUCATION/TRAINING PROGRAM | Admitting: STUDENT IN AN ORGANIZED HEALTH CARE EDUCATION/TRAINING PROGRAM
Payer: COMMERCIAL

## 2025-02-17 DIAGNOSIS — I73.00 RAYNAUD'S DISEASE WITHOUT GANGRENE: ICD-10-CM

## 2025-02-17 DIAGNOSIS — I67.1 INTRACRANIAL ANEURYSM: ICD-10-CM

## 2025-02-17 PROCEDURE — 71275 CT ANGIOGRAPHY CHEST: CPT | Mod: 26 | Performed by: RADIOLOGY

## 2025-02-17 PROCEDURE — 250N000011 HC RX IP 250 OP 636: Performed by: STUDENT IN AN ORGANIZED HEALTH CARE EDUCATION/TRAINING PROGRAM

## 2025-02-17 PROCEDURE — 74174 CTA ABD&PLVS W/CONTRAST: CPT | Mod: 26 | Performed by: RADIOLOGY

## 2025-02-17 PROCEDURE — 74174 CTA ABD&PLVS W/CONTRAST: CPT

## 2025-02-17 RX ORDER — IOPAMIDOL 755 MG/ML
110 INJECTION, SOLUTION INTRAVASCULAR ONCE
Status: COMPLETED | OUTPATIENT
Start: 2025-02-17 | End: 2025-02-17

## 2025-02-17 RX ADMIN — IOPAMIDOL 110 ML: 755 INJECTION, SOLUTION INTRAVENOUS at 13:37

## 2025-02-19 ENCOUNTER — VIRTUAL VISIT (OUTPATIENT)
Dept: NEUROSURGERY | Facility: CLINIC | Age: 50
End: 2025-02-19
Attending: PSYCHIATRY & NEUROLOGY
Payer: COMMERCIAL

## 2025-02-19 VITALS — WEIGHT: 134 LBS | BODY MASS INDEX: 21.53 KG/M2 | HEIGHT: 66 IN

## 2025-02-19 DIAGNOSIS — I67.1 INTRACRANIAL ANEURYSM: ICD-10-CM

## 2025-02-19 ASSESSMENT — PAIN SCALES - GENERAL: PAINLEVEL_OUTOF10: NO PAIN (0)

## 2025-02-19 NOTE — LETTER
2025       RE: Chandra L Reyer  [address] [place] [address]     Dear Colleague,    Thank you for referring your patient, Chandra L Reyer, to the [facility] NEUROSURGERY [facility]INIC [place] at [facility]. Please see a copy of my visit note below.    Virtual Visit Details    Type of service:  Video Visit     Originating Location (pt. Location): Home    Distant Location (provider location):  Off-site  Platform used for Video Visit: [facility]  Department of Neurosurgery      Name: Chandra L Reyer  MRN: [id number]  Age: 50 year old  : 1975  Referring provider: Azalea Robertson  2025      Chief Complaint:   Multiple cerebral aneurysms, nonruptured  New patient    History of Present Illness:   Chandra L Reyer is a 50 year old female with a history of type 2 diabetes who is seen today for recent finding of multiple brain aneurysms.    Patient had a brain imaging to evaluate left-sided numbness.  This showed evidence for bilateral chronic cerebellar infarcts.  She underwent a head CTA which did not show significant large vessel occlusion or stenosis: However showed multiple intracranial aneurysms. There was also some concern that the irregularity and amount of aneurysms was suggestive of a possible underlying connective tissue disorder.  She was referred to us for further management of cerebral aneurysms.    Today I had a video visit with the patient.  She denies any known family history of brain aneurysms.  She is normotensive and a non-smoker.      Due to the multiple aneurysms, she was also seen by rheumatology.  She had a visit on .  Possible vasculitis/vasculopathy was discussed.  Additional labs are ordered and she is scheduled for a follow-up with them on Friday.    Review of Systems:   Pertinent items are noted in HPI or as in patient entered ROS below, remainder of complete ROS is negative.        No  "data to display                 Physical Exam:   Ht 1.683 m (5' 6.25\")   Wt 60.8 kg (134 lb)   BMI 21.47 kg/m     General: No acute distress.    Neuro: The patient is fully oriented. Speech is normal.  Psych: Normal mood and affect. Behavior is normal.        Imagin2025 CTA head:  HEAD CTA:   1.  Multiple atypical appearing aneurysms regarding the anterior circulation as described. A referral to neuro interventional radiology for further workup is recommended. Additionally, given the number and appearance of these aneurysms, an underlying   connective tissue disease may explain the etiology. A referral to rheumatology is recommended as well.  2.  The left AICA is difficult to visualize past its proximal segment but may be thrombosed or is too diminutive to be visualized with a CT angiogram.  The      Assessment:  Multiple cerebral aneurysm, nonruptured  New patient    Plan:  50-year-old female presenting with multiple brain aneurysms.  She is also following up with rheumatology for possible vasculitis/vasculopathy workup.    Today we had a detailed discussion about brain aneurysms, risk factors management options.  Case was discussed with Dr. Kang who recommended to proceed with a diagnostic angiogram due to the multiple brain aneurysms.  Patient was informed about this recommendation.  She will discuss this with her family members and will contact me through [employer] if she would like to proceed with this recommendation.         I spent 40 minutes on patient care activities related to this encounter on the date of service, including time spent reviewing the chart, obtaining history and examination and in counseling the patient, and in documentation in the electronic medical record.      Amy QURESHI CNP  Department of Neurosurgery      Again, thank you for allowing me to participate in the care of your patient.      Sincerely,    KIERA Mathis CNP    "

## 2025-02-19 NOTE — PROGRESS NOTES
"Virtual Visit Details    Type of service:  Video Visit     Originating Location (pt. Location): Home    Distant Location (provider location):  Off-site  Platform used for Video Visit: [facility]  Department of Neurosurgery      Name: Chandra L Reyer  MRN: [id number]  Age: 50 year old  : 1975  Referring provider: Azalea Robertson  2025      Chief Complaint:   Multiple cerebral aneurysms, nonruptured  New patient    History of Present Illness:   Chandra L Reyer is a 50 year old female with a history of type 2 diabetes who is seen today for recent finding of multiple brain aneurysms.    Patient had a brain imaging to evaluate left-sided numbness.  This showed evidence for bilateral chronic cerebellar infarcts.  She underwent a head CTA which did not show significant large vessel occlusion or stenosis: However showed multiple intracranial aneurysms. There was also some concern that the irregularity and amount of aneurysms was suggestive of a possible underlying connective tissue disorder.  She was referred to us for further management of cerebral aneurysms.    Today I had a video visit with the patient.  She denies any known family history of brain aneurysms.  She is normotensive and a non-smoker.      Due to the multiple aneurysms, she was also seen by rheumatology.  She had a visit on .  Possible vasculitis/vasculopathy was discussed.  Additional labs are ordered and she is scheduled for a follow-up with them on Friday.    Review of Systems:   Pertinent items are noted in HPI or as in patient entered ROS below, remainder of complete ROS is negative.        No data to display                 Physical Exam:   Ht 1.683 m (5' 6.25\")   Wt 60.8 kg (134 lb)   BMI 21.47 kg/m     General: No acute distress.    Neuro: The patient is fully oriented. Speech is normal.  Psych: Normal mood and affect. Behavior is normal.        Imagin2025 CTA head:  HEAD CTA:   1.  Multiple " atypical appearing aneurysms regarding the anterior circulation as described. A referral to neuro interventional radiology for further workup is recommended. Additionally, given the number and appearance of these aneurysms, an underlying   connective tissue disease may explain the etiology. A referral to rheumatology is recommended as well.  2.  The left AICA is difficult to visualize past its proximal segment but may be thrombosed or is too diminutive to be visualized with a CT angiogram.  The      Assessment:  Multiple cerebral aneurysm, nonruptured  New patient    Plan:  50-year-old female presenting with multiple brain aneurysms.  She is also following up with rheumatology for possible vasculitis/vasculopathy workup.    Today we had a detailed discussion about brain aneurysms, risk factors management options.  Case was discussed with Dr. Kang who recommended to proceed with a diagnostic angiogram due to the multiple brain aneurysms.  Patient was informed about this recommendation.  She will discuss this with her family members and will contact me through [employer] if she would like to proceed with this recommendation.         I spent 40 minutes on patient care activities related to this encounter on the date of service, including time spent reviewing the chart, obtaining history and examination and in counseling the patient, and in documentation in the electronic medical record.      Amy QURESHI, CNP  Department of Neurosurgery

## 2025-02-19 NOTE — NURSING NOTE
Current patient location: [address] [place] [address]    Is the patient currently in the state of [place]? YES    Visit mode: VIDEO    If the visit is dropped, the patient can be reconnected by:VIDEO VISIT: Send to e-mail at: [email or web address]    Will anyone else be joining the visit? NO  (If patient encounters technical issues they should call [phone])    Are changes needed to the allergy or medication list? Pt stated no changes to allergies and Pt stated no med changes    Are refills needed on medications prescribed by this physician? NO    Rooming Documentation:  Not applicable    Reason for visit: Consult    Sally PICEKTT

## 2025-02-21 ENCOUNTER — TELEPHONE (OUTPATIENT)
Dept: RHEUMATOLOGY | Facility: CLINIC | Age: 50
End: 2025-02-21

## 2025-02-21 NOTE — TELEPHONE ENCOUNTER
Called pt and LVM asking her to call back to see if she wanted to do a video visit today or vicky with Dr. Ortiz. She is out of the office today but able to do video visit (not telephone) or reschedule for MELCHOR time another day.

## 2025-02-24 ENCOUNTER — TELEPHONE (OUTPATIENT)
Dept: VASCULAR SURGERY | Facility: CLINIC | Age: 50
End: 2025-02-24
Payer: COMMERCIAL

## 2025-02-24 NOTE — TELEPHONE ENCOUNTER
Vascular Referral Intake    Appointment note (to be pasted into appt note. 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 Berumen Baadh    Visit Type: New    Time Frame: Next Available    Testing/Imaging Needed Before Consult: N/A    Sunny or bed needed: No    *Schedulers: Please send welcome letter to patient after appointment(s) have been scheduled*

## 2025-02-24 NOTE — TELEPHONE ENCOUNTER
Writer called and offered sooner appointment with Dr. Gracia in [place], but patient cannot come in on Tuesday mornings when he is in clinic.

## 2025-02-24 NOTE — TELEPHONE ENCOUNTER
The pt is scheduled on 5/23/25 at the [facility] with .  The referral mentioned a particular provider do you know if that provider has sooner availability?  The pt was on the phone during scheduling of the above appointments and agreed to the dates times and locations of the appointments.     Uche Dupree on 2/24/2025 at 3:44 PM

## 2025-02-24 NOTE — TELEPHONE ENCOUNTER
Caller: Hiram    Provider: None    Detailed reason for call: Pt has a vascular referral placed on 02/21/2025. Referral has not been processed. Please review to identify the provider and if imaging is needed.     Best phone number to contact: [phone]    Best time to contact: Anytime    Ok to leave a detailed message: Yes    Ok to speak to authorized person if needed: Yes, can speak to spouse (Aristeo).      (Noted to patient if reason is related to wound or incision, to please send a photo via email or [employer].)

## 2025-03-28 ENCOUNTER — TELEPHONE (OUTPATIENT)
Dept: RHEUMATOLOGY | Facility: CLINIC | Age: 50
End: 2025-03-28

## 2025-03-28 PROBLEM — I67.1 INTRACRANIAL ANEURYSM: Status: ACTIVE | Noted: 2025-03-28

## 2025-03-28 PROBLEM — I73.00 RAYNAUD'S DISEASE WITHOUT GANGRENE: Status: ACTIVE | Noted: 2025-03-28

## 2025-04-03 ENCOUNTER — HOSPITAL ENCOUNTER (OUTPATIENT)
Dept: MRI IMAGING | Facility: CLINIC | Age: 50
Discharge: HOME | End: 2025-04-03
Attending: STUDENT IN AN ORGANIZED HEALTH CARE EDUCATION/TRAINING PROGRAM
Payer: COMMERCIAL

## 2025-04-03 DIAGNOSIS — I67.1 INTRACRANIAL ANEURYSM: ICD-10-CM

## 2025-04-03 DIAGNOSIS — I72.8 SPLENIC ARTERY ANEURYSM: ICD-10-CM

## 2025-04-03 PROCEDURE — 70546 MR ANGIOGRAPH HEAD W/O&W/DYE: CPT

## 2025-04-03 PROCEDURE — 255N000002 HC RX 255 OP 636: Performed by: STUDENT IN AN ORGANIZED HEALTH CARE EDUCATION/TRAINING PROGRAM

## 2025-04-03 PROCEDURE — A9585 GADOBUTROL INJECTION: HCPCS | Performed by: STUDENT IN AN ORGANIZED HEALTH CARE EDUCATION/TRAINING PROGRAM

## 2025-04-03 RX ORDER — GADOBUTROL 604.72 MG/ML
7.5 INJECTION INTRAVENOUS ONCE
Status: COMPLETED | OUTPATIENT
Start: 2025-04-03 | End: 2025-04-03

## 2025-04-03 RX ADMIN — GADOBUTROL 6 ML: 604.72 INJECTION INTRAVENOUS at 10:15

## 2025-05-08 ENCOUNTER — OFFICE VISIT (OUTPATIENT)
Dept: NEUROLOGY | Facility: CLINIC | Age: 50
End: 2025-05-08
Payer: COMMERCIAL

## 2025-05-08 VITALS — OXYGEN SATURATION: 100 % | HEART RATE: 83 BPM | DIASTOLIC BLOOD PRESSURE: 70 MMHG | SYSTOLIC BLOOD PRESSURE: 106 MMHG

## 2025-05-08 DIAGNOSIS — R93.0 ABNORMAL MRI OF THE HEAD: Primary | ICD-10-CM

## 2025-05-08 DIAGNOSIS — I67.1 INTRACRANIAL ANEURYSM: ICD-10-CM

## 2025-05-08 NOTE — NURSING NOTE
"Chandra L Reyer is a 50 year old female who presents for:  Chief Complaint   Patient presents with    RECHECK     Feels like she has a lot of medical visits occurring right now. She feels stable since last visit        Initial Vitals:  /70 (BP Location: Left arm, Patient Position: Sitting, Cuff Size: Adult Regular)   Pulse 83   SpO2 100%  Estimated body mass index is 21.31 kg/m  as calculated from the following:    Height as of 3/28/25: 1.676 m (5' 6\").    Weight as of 3/28/25: 59.9 kg (132 lb).. There is no height or weight on file to calculate BSA. BP completed using cuff size: regular    Cooper Taylor    "

## 2025-05-08 NOTE — LETTER
2025      Chandra L Reyer  [address] [place] [address]      Dear Colleague,    Thank you for referring your patient, Chandra L Reyer, to the [facility] NEUROLOGY [facility]IN[facility]. Please see a copy of my visit note below.      Neurology Consultation    Patient Name:  Chandra L Reyer  MRN:  [id number]    :  1975  Date of Service:  May 8, 2025  Primary care provider:  Mara Crowder        Chief Complaint: Follow-up     History of Present Illness:     Chandra L Reyer is a 50 year old female who presents for routine follow-up. Left sided numbness remains unchanged. Was not sure what to do with ASA as neuro IR said not to take it if she is getting angiogram. She is uncertain about doing angiogram as her father went in for a lung biopsy and passed away from complications related to this.      Denies diplopia or loss of vision. Does occasionally have blurry vision. Has been checked by optometry/ophthalmology for diabetic eye exam.      Reviewed rheumatology notes: normal inflammatory markers, negative ANCA's, negative YARA's, negative cryoglobulins, and negative SCL-70, Mirna 1, and centromer. Workup so far is not indicative of a vasculitis. Referred to vascular     Neuro IR/neurosurgery: would like to do a traditional angiogram     MRA brain: 1. No evidence to suggest vasculitis.  2. 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 Notably, this aneurysm demonstrates vessel wall  enhancement on postcontrast images which indicates wall inflammation  and increases risk of complication. Additionally this aneurysm exerts  compressive mass effect on the left prechiasmatic optic nerve.  3. The previously described extradural saccular aneurysms arising from  right internal carotid artery cavernous segment is similar in size to  prior CTA.      CTA c/a/p: IMPRESSION:  1. Small aneurysms:  A. Aortic arch: 4 mm nubbin proximal to the left subclavian origin.  B. Mid splenic artery: 5.3 x 4.8 x 5.3 mm saccular aneurysm.   C. Mid-distal splenic artery: 5.3 x 4.8 x 4.8 mm saccular aneurysm.    Prior History from 2/5/2025:     Left sided numbness is largely the same.  She  has not started ASA.  Had questions about it.      Denies problems arthralgias, myalgias, swelling/erythematous joints, easy bruisability, easy dislocation of joints.      No family history of rheumatologic disorders that she is aware of. Denies family history of aneurysm.      Mom has diabetes type II, mom's side of the family has thyroid issues. Father passed away in his 50s from pulmonary scarring issues?  Paternal grandparents both passed away in 40s, maternal grandfather from a heart defect and grandmother from multiple myeloma     HEAD CT:  1.  No acute intracranial pathology.  2.  Small, chronic appearing infarction in the left cerebellar hemisphere     HEAD CTA:   1.  Multiple atypical appearing aneurysms regarding the anterior circulation as described. A referral to neuro interventional radiology for further workup is recommended. Additionally, given the number and appearance of these aneurysms, an underlying   connective tissue disease may explain the etiology. A referral to rheumatology is recommended as well.  2.  The left AICA is difficult to visualize past its proximal segment but may be thrombosed or is too diminutive to be visualized with a CT angiogram.     NECK CTA:  1.  Normal neck CTA.     Prior History from 10/31/2024:  She got the Moderna booster in 2021 and then immediately had numbness and tingling in the left side of her face and arm (received the booster in her left arm).  This then resolved but then started getting it intermittently. It got progressively worse over time and now has it most days.  It lasts a couple of minutes to 15 minutes on average.  She has not  identified any provoking factor. Has not appreciated any particular time of day that it comes more often.  Feels this in the left side of her face, left arm, and left leg.  They don't always come in the same locations at the same time.  She appreciates more in her face though.  In her arm, she notices it from the elbow down to her wrist and in the leg more in the foot.  Denies associated pain or weakness.  Denies it limiting her activities.  Denies balance problems, falls, or dizziness associated. Has not identified any alleviating factors.  Symptoms are never on the right side. Thought massage therapy helped (per review of her PCP's note maybe 20-40%).        Denies miscarriages. Denies personal history of blood clots or TIAs.  Mom has had a TIAs. Maternal uncle passed away at age 60 - maybe had a blood clot - diabetes, transplants  - thought it was related to agent orange exposure. Has a history of ocular migraines - has been several years since she has had them.       Denies any significant events/episodes of neurologic episodes in the past.      Did not start ASA 81 mg.      Review of Records   - Was seen at the [facility]. Had MRI brain W/WO and MRI C-spine W/WO. Was recommended that she start ASA 81 mg for chronic infarcts.   - MRI brain W/WO was reviewed.  Official read:  No acute abnormality.  Chronic bilateral cerebellar lacunar infarcts.Non specific prominence of the supratentorial ventricles out of proportion to the sulci without periventricular hyperintensity to indicated acute hydrocephalus. 1.7 cm arachnoid cyst at the anterior left middle cranial fossa. Scattered foci of T2 prolongation throughout the subcortical white matter of both cerebral hemispheres mostly within the frontal lobes out of proportion to the patient's age.    - MRI C-spine W/WO: normal cervical spine signal. At C5-6 mild intervertebral disc height loss and moderate right neural foraminal narrowing      Physical  Exam:  /70 (BP Location: Left arm, Patient Position: Sitting, Cuff Size: Adult Regular)   Pulse 83   SpO2 100%   General:  No acute distress  Cardiovascular: Normal rate.  Lung: Respirations are non-labored.  Extremities: Normal range of motion  Integumentary: Warm and dry     Neurologic:  Mental status : alert, fund of knowledge appropriate for visit     LANGUAGE: Speech fluent, no dysarthria      CN:  II- pupils equal and reactive, visual fields full  III, IV, VI- extraocular movements intact  V- sensation intact bilaterally  VII- face symmetric  VIII- hearing intact, no nystagmus  IX, X- palate elevates symmetrically  XI- sternocleidomastoid 5/5  XII- tongue midline     MOTOR : intact bulk and tone  5/5 strength in all ext     SENSORY : intact to light touch in BUE and BLE, no asymmetry      REFLEXES: Vaughn absent      CEREBELLUM: finger to nose, finger taps, and heel to shin intact bilaterally      GAIT : Largely normal      Cognition and Speech: Speech clear and coherent.     Psychiatric: Cooperative, Appropriate mood & affect.    Assessment/Plan:   Chandra L Reyer is a 50 year old year old female who presents for routine follow-up concerning intermittent left sided (face, arm, and leg) numbness.  Neurologic exam is largely unremarkable. Reviewed her MRIs. Counseled patient that I did not see anything on her imaging,nor could I think of any neurologic disorder, that can readily explain her symptoms.  I am uncertain what further testing might provide an etiology. She does not really have pain associated so neuropathic pain agents unlikely to be that beneficial.       Her MRI does show a rather significant white matter disease burden, especially for her age and relatively few cardiovascular risk factors.  Also, surprisingly, she has evidence for bilateral chronic cerebellar infarcts.  Obtained CTA H and N for stroke workup which did not show significant LVO or stenosis; however, appreciated multiple  intracranial aneurysms. She saw rheumatology to evaluate for a possible vasculopathy/vasculitis as the cause but workup thus far has not supported this diagnosis.  Was referred to vascular and has this appointment coming up.  Saw neuro IR and traditional angiogram was recommended. She is apprehensive about this due to risks and family history of complications with procedures.  MRA obtained does show that  left para ophthalmic internal carotid artery aneurysm has mass effect on the left prechiasmatic optic nerve. Will refer to neuro-ophthalmology for evaluation. Encouraged follow-up with neuro-IR pending evaluation with vascular.     TTE ordered, will need to schedule. LDL 69 and A1c 6.3%.  Recommended ASA 81 mg for secondary stroke prevention.      Intermittent left sided numbness/tingling   Incidental bilateral cerebellar infarcts   Incidental multiple intracranial aneurysms      Plan  > Refer to neuro-ophthalmology   > TTE with bubble   > Start ASA 81 mg daily   > LDL at goal (40-70)  > Follow-up in 3 months      I spent 44 minutes providing care for this patient, including reviewing imaging, labs, and notes as well as providing counseling and coordination of care for the above issues.    Again, thank you for allowing me to participate in the care of your patient.        Sincerely,        Azalea Robertson, DO    Electronically signed

## 2025-05-08 NOTE — PROGRESS NOTES
Neurology Consultation    Patient Name:  Chandra L Reyer  MRN:  [id number]    :  1975  Date of Service:  May 8, 2025  Primary care provider:  Mara Crowder        Chief Complaint: Follow-up     History of Present Illness:     Chandra L Reyer is a 50 year old female who presents for routine follow-up. Left sided numbness remains unchanged. Was not sure what to do with ASA as neuro IR said not to take it if she is getting angiogram. She is uncertain about doing angiogram as her father went in for a lung biopsy and passed away from complications related to this.      Denies diplopia or loss of vision. Does occasionally have blurry vision. Has been checked by optometry/ophthalmology for diabetic eye exam.      Reviewed rheumatology notes: normal inflammatory markers, negative ANCA's, negative YARA's, negative cryoglobulins, and negative SCL-70, Mirna 1, and centromer. Workup so far is not indicative of a vasculitis. Referred to vascular     Neuro IR/neurosurgery: would like to do a traditional angiogram     MRA brain: 1. No evidence to suggest vasculitis.  2. 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 Notably, this aneurysm demonstrates vessel wall  enhancement on postcontrast images which indicates wall inflammation  and increases risk of complication. Additionally this aneurysm exerts  compressive mass effect on the left prechiasmatic optic nerve.  3. The previously described extradural saccular aneurysms arising from  right internal carotid artery cavernous segment is similar in size to  prior CTA.     CTA c/a/p: IMPRESSION:  1. Small aneurysms:  A. Aortic arch: 4 mm nubbin proximal to the left subclavian origin.  B. Mid splenic artery: 5.3 x 4.8 x 5.3 mm saccular aneurysm.   C. Mid-distal splenic artery: 5.3 x 4.8 x 4.8 mm saccular  aneurysm.    Prior History from 2/5/2025:     Left sided numbness is largely the same.  She  has not started ASA.  Had questions about it.      Denies problems arthralgias, myalgias, swelling/erythematous joints, easy bruisability, easy dislocation of joints.      No family history of rheumatologic disorders that she is aware of. Denies family history of aneurysm.      Mom has diabetes type II, mom's side of the family has thyroid issues. Father passed away in his 50s from pulmonary scarring issues?  Paternal grandparents both passed away in 40s, maternal grandfather from a heart defect and grandmother from multiple myeloma     HEAD CT:  1.  No acute intracranial pathology.  2.  Small, chronic appearing infarction in the left cerebellar hemisphere     HEAD CTA:   1.  Multiple atypical appearing aneurysms regarding the anterior circulation as described. A referral to neuro interventional radiology for further workup is recommended. Additionally, given the number and appearance of these aneurysms, an underlying   connective tissue disease may explain the etiology. A referral to rheumatology is recommended as well.  2.  The left AICA is difficult to visualize past its proximal segment but may be thrombosed or is too diminutive to be visualized with a CT angiogram.     NECK CTA:  1.  Normal neck CTA.     Prior History from 10/31/2024:  She got the Moderna booster in 2021 and then immediately had numbness and tingling in the left side of her face and arm (received the booster in her left arm).  This then resolved but then started getting it intermittently. It got progressively worse over time and now has it most days.  It lasts a couple of minutes to 15 minutes on average.  She has not identified any provoking factor. Has not appreciated any particular time of day that it comes more often.  Feels this in the left side of her face, left arm, and left leg.  They don't always come in the same locations at the same time.   She appreciates more in her face though.  In her arm, she notices it from the elbow down to her wrist and in the leg more in the foot.  Denies associated pain or weakness.  Denies it limiting her activities.  Denies balance problems, falls, or dizziness associated. Has not identified any alleviating factors.  Symptoms are never on the right side. Thought massage therapy helped (per review of her PCP's note maybe 20-40%).        Denies miscarriages. Denies personal history of blood clots or TIAs.  Mom has had a TIAs. Maternal uncle passed away at age 60 - maybe had a blood clot - diabetes, transplants  - thought it was related to agent orange exposure. Has a history of ocular migraines - has been several years since she has had them.       Denies any significant events/episodes of neurologic episodes in the past.      Did not start ASA 81 mg.      Review of Records   - Was seen at the [facility]. Had MRI brain W/WO and MRI C-spine W/WO. Was recommended that she start ASA 81 mg for chronic infarcts.   - MRI brain W/WO was reviewed.  Official read:  No acute abnormality.  Chronic bilateral cerebellar lacunar infarcts.Non specific prominence of the supratentorial ventricles out of proportion to the sulci without periventricular hyperintensity to indicated acute hydrocephalus. 1.7 cm arachnoid cyst at the anterior left middle cranial fossa. Scattered foci of T2 prolongation throughout the subcortical white matter of both cerebral hemispheres mostly within the frontal lobes out of proportion to the patient's age.    - MRI C-spine W/WO: normal cervical spine signal. At C5-6 mild intervertebral disc height loss and moderate right neural foraminal narrowing      Physical Exam:  /70 (BP Location: Left arm, Patient Position: Sitting, Cuff Size: Adult Regular)   Pulse 83   SpO2 100%   General:  No acute distress  Cardiovascular: Normal rate.  Lung: Respirations are non-labored.  Extremities: Normal  range of motion  Integumentary: Warm and dry     Neurologic:  Mental status : alert, fund of knowledge appropriate for visit     LANGUAGE: Speech fluent, no dysarthria      CN:  II- pupils equal and reactive, visual fields full  III, IV, VI- extraocular movements intact  V- sensation intact bilaterally  VII- face symmetric  VIII- hearing intact, no nystagmus  IX, X- palate elevates symmetrically  XI- sternocleidomastoid 5/5  XII- tongue midline     MOTOR : intact bulk and tone  5/5 strength in all ext     SENSORY : intact to light touch in BUE and BLE, no asymmetry      REFLEXES: Vaughn absent      CEREBELLUM: finger to nose, finger taps, and heel to shin intact bilaterally      GAIT : Largely normal      Cognition and Speech: Speech clear and coherent.     Psychiatric: Cooperative, Appropriate mood & affect.    Assessment/Plan:   Chandra L Reyer is a 50 year old year old female who presents for routine follow-up concerning intermittent left sided (face, arm, and leg) numbness.  Neurologic exam is largely unremarkable. Reviewed her MRIs. Counseled patient that I did not see anything on her imaging,nor could I think of any neurologic disorder, that can readily explain her symptoms.  I am uncertain what further testing might provide an etiology. She does not really have pain associated so neuropathic pain agents unlikely to be that beneficial.       Her MRI does show a rather significant white matter disease burden, especially for her age and relatively few cardiovascular risk factors.  Also, surprisingly, she has evidence for bilateral chronic cerebellar infarcts.  Obtained CTA H and N for stroke workup which did not show significant LVO or stenosis; however, appreciated multiple intracranial aneurysms. She saw rheumatology to evaluate for a possible vasculopathy/vasculitis as the cause but workup thus far has not supported this diagnosis.  Was referred to vascular and has this appointment coming up.  Saw neuro IR  and traditional angiogram was recommended. She is apprehensive about this due to risks and family history of complications with procedures.  MRA obtained does show that  left para ophthalmic internal carotid artery aneurysm has mass effect on the left prechiasmatic optic nerve. Will refer to neuro-ophthalmology for evaluation. Encouraged follow-up with neuro-IR pending evaluation with vascular.     TTE ordered, will need to schedule. LDL 69 and A1c 6.3%.  Recommended ASA 81 mg for secondary stroke prevention.      Intermittent left sided numbness/tingling   Incidental bilateral cerebellar infarcts   Incidental multiple intracranial aneurysms      Plan  > Refer to neuro-ophthalmology   > TTE with bubble   > Start ASA 81 mg daily   > LDL at goal (40-70)  > Follow-up in 3 months      I spent 44 minutes providing care for this patient, including reviewing imaging, labs, and notes as well as providing counseling and coordination of care for the above issues.

## 2025-05-12 ENCOUNTER — PATIENT OUTREACH (OUTPATIENT)
Dept: CARE COORDINATION | Facility: CLINIC | Age: 50
End: 2025-05-12
Payer: COMMERCIAL

## 2025-05-20 ENCOUNTER — TELEPHONE (OUTPATIENT)
Dept: OPHTHALMOLOGY | Facility: CLINIC | Age: 50
End: 2025-05-20
Payer: COMMERCIAL

## 2025-05-22 RX ORDER — ASPIRIN 81 MG/1
81 TABLET, CHEWABLE ORAL DAILY
COMMUNITY
Start: 2024-05-16

## 2025-05-22 NOTE — PATIENT INSTRUCTIONS
Thank you so much for choosing us for your care. It was a pleasure to see you at the vascular clinic today.     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  - Priority referral to genetics.  - Follow-up in August with CTA chest/abdomen/pelvis with runoff prior.      Our scheduling team will get in touch with you to set up any follow-up testing/imaging and/or appointments. Please be aware that any testing/imaging recommended today will need to completed prior to your next visit with the provider. If testing/imaging is not completed prior to your next visit, your visit may be rescheduled.     If you have any questions, please contact our clinic directly at [phone] and ask for the nurse. We also encourage the use of [employer] to communicate with your healthcare provider.    If you have an urgent need after business hours (8:00 am to 4:30 pm) please call [phone], option 4, and ask for the vascular attending on call. For non-urgent after hours needs, please call the vascular clinic at [phone]. For scheduling needs, please call our clinic directly at [phone].    For additional patient education resources, please visit the Society of Vascular [employer] patient resources website at: [email or web address]

## 2025-05-23 ENCOUNTER — OFFICE VISIT (OUTPATIENT)
Dept: VASCULAR SURGERY | Facility: CLINIC | Age: 50
End: 2025-05-23
Payer: COMMERCIAL

## 2025-05-23 VITALS
BODY MASS INDEX: 21.47 KG/M2 | WEIGHT: 133 LBS | HEART RATE: 87 BPM | SYSTOLIC BLOOD PRESSURE: 113 MMHG | DIASTOLIC BLOOD PRESSURE: 77 MMHG | OXYGEN SATURATION: 98 %

## 2025-05-23 DIAGNOSIS — I67.1 INTRACRANIAL ANEURYSM: ICD-10-CM

## 2025-05-23 DIAGNOSIS — Q35.7 BIFID UVULA: ICD-10-CM

## 2025-05-23 DIAGNOSIS — I72.8 SPLENIC ARTERY ANEURYSM: Primary | ICD-10-CM

## 2025-05-23 PROCEDURE — 1126F AMNT PAIN NOTED NONE PRSNT: CPT | Performed by: HOSPITALIST

## 2025-05-23 PROCEDURE — 3074F SYST BP LT 130 MM HG: CPT | Performed by: HOSPITALIST

## 2025-05-23 PROCEDURE — 99417 PROLNG OP E/M EACH 15 MIN: CPT | Performed by: HOSPITALIST

## 2025-05-23 PROCEDURE — G2211 COMPLEX E/M VISIT ADD ON: HCPCS | Performed by: HOSPITALIST

## 2025-05-23 PROCEDURE — 99205 OFFICE O/P NEW HI 60 MIN: CPT | Performed by: HOSPITALIST

## 2025-05-23 PROCEDURE — 3078F DIAST BP <80 MM HG: CPT | Performed by: HOSPITALIST

## 2025-05-23 ASSESSMENT — PAIN SCALES - GENERAL: PAINLEVEL_OUTOF10: NO PAIN (0)

## 2025-05-23 NOTE — NURSING NOTE
Chief Complaint   Patient presents with    New Patient     5/23/2025 visit with Layne Kaufman MD for NEW VASCULAR PATIENT - Consult for multiple aneurysm; splenic and aortic arch <1.0cm       Vitals were taken and medications reconciled.    Charlie Pardo, Visit Facilitator  1:26 PM

## 2025-05-23 NOTE — PROGRESS NOTES
VASCULAR MEDICINE CONSULT NOTE          LOCATION:  [facility]      Date of Service: 5/23/2025      Primary Care Provider: Mara Crowder  Referring provider;  Lisha Cole*      Reason for the visit/chief complaint:   Multiple aneurysms      HPI:  Chandra L Reyer is a pleasant 50 year old female who presents to our Vascular Medicine clinic for the above mentioned reason.  She is accompanied by her  Aristeo.    Ms. Reyer has medical history significant for alopecia areata and well-controlled type 2 diabetes mellitus.    She has been having intermittent left sided (face, arm, and leg) numbness for few years now since at least 2021.  She was previously evaluated by outside neurology but recently referred to [facility] with neurology for second opinion.  She saw Dr. Mejia 10/31/2024.  It is of note that her previous outside MRI showed bilateral chronic cerebellar infarcts and white matter disease that neurology felt to be significant for her age.  Neurology then ordered CTA head and neck for stroke workup that was completed 2/4/2025 at did not necessarily show significant large vessel disease or stenosis however came back with multiple findings mainly intracranial aneurysms including:  Right cavernous ICA aneurysm 2 mm  Right ophthalmic ICA 1.3 mm x 2.1 mm   Anteriorly oriented aneurysm in close proximity to the takeoff of the left ophthalmic artery measuring 3.5 x 3.4 mm     She did complete her stroke workup with transthoracic echocardiogram with bubble study (not completed).  Her diabetes has been under very good control with A1c consistently less than 7%.  LDL was noted to be less than 70.  She has been started on aspirin 81 mg for stroke secondary prevention.    She was subsequently referred to neurosurgery for her intracranial aneurysms and rheumatology given concern for vasculitis.    She was seen by rheumatology on 2/7/2025.  She  was found to have new onset Raynaud's and dilated nailfold capillary loops on exam.  Other than this and positive WAI with 1: 320 dense fine speckled, multiple serological vasculitis workup came back unrevealing (Neg/normal ESR, CRP, ANCA, MPO, PR3, Chaney, RNP, SSA, SSB, dsDNA, Mirna 1, SCL-70, centromere,  C3, C4, cryoglobulin).    CTA chest abdomen pelvis was completed 2025 and this came back showin mm nubbin proximal to the left subclavian origin   Small saccular dilatation of the splenic artery  5.3 x 4.8 x 5.3 mm and 5.3 x 4.8 x 4.8 mm.  No other aneurysms or dissection noted.    PET scan was considered by rheumatology however was denied by insurance.  With vasculitis felt unlikely, she was referred to our clinic by rheumatology for connective tissue disease workup.    She was seen by neurosurgery on 2025.  She was recommended to proceed with diagnostic angiogram for better evaluation.  Patient however was hesitant and apprehensive about this procedure and concerned (given her father history of passing away after long biopsy procedure).    MRA head was completed last month 4/3/2025 and this confirmed no evidence of vasculitis.  There was concern for vessel wall enhancement indicating wall inflammation and increasing risk of complication on one of her aneurysm that is arising from left  para ophthalmic internal carotid artery that measures 3.1 x 5.5 x 1 mm.  This was also noted to exert compressive mass effect on the left prechiasmatic optic nerve.  The other cavernous segment ICA saccular aneurysm was stable when compared to her CTA 2 months prior.  On further follow-up with neurology earlier this month, she was referred to neuro-ophthalmology for further evaluation.  This is not yet completed.      Today, Hiram confirms the above history.        We verified her family history as follows:    No family history of aneurysms, dissections that she is aware of.    Father: Was a smoker.  He was  "diagnosed with pulmonary fibrosis in .  He passed away suddenly and quickly after what appears to be \" simple\" lung biopsy.  The details about the complications are unknown.  Paternal grandmother had reportedly heart attacks since early 40s and  in her early 60s.  Paternal grandfather  in his 80s (natural death).  Maternal grandmother had rheumatoid arthritis ( from cancer in her 40s).  Maternal grandfather  in his mid 40s too but previously had known congenital heart disease (was previously told that he will not make it past 25).    She is the oldest, has 3 full siblings and 1 half sister.     Obstetric history: She had one pregnancy with only complication being gestational diabetes.  19-year-old college daughter who is otherwise     Surgical history: Ms. Reyer never had any procedure or surgeries done in the past.    Social history: Never smoked.  Denies illicit drug use.    She has [place] ethnicity.    Denies history of lens dislocation, abnormal eye-width.  Denies joint dislocation, hypermobility, skin translucency, abnormal wound healing, easy bruisability, prolapse or hernias.  Denies chest wall deformities, dental crowding, scoliosis, spine instability or long fingers and toes.  No history of organ rupture.        REVIEW OF SYSTEMS:    A 12 point ROS was reviewed and is negative except what is mentioned in HPI.       Past medical history, surgical history, medications, family history, social history and allergies were reviewed. Pertinent points mentioned under HPI.    OBJECTIVE:    Vital signs:  There were no vitals taken for this visit.  Wt Readings from Last 1 Encounters:   25 132 lb     There is no height or weight on file to calculate BMI.    Physical exam:  General appearance: Pleasant female in no apparent distress.    HEENT: NC/AT.  Bifid uvula.  Neck: Carotids +2/2 bilaterally without bruits.  No jugular venous distension.   Heart: RRR. Normal S1, S2. No " murmur, rub, click, or gallop.   Chest: Clear to auscultation bilaterally.  No pectus deformities.  Abdomen: Soft, nontender, nondistended. No pulsatile mass.  No bruits.   Extremities: Normal fingers and toes.    Skin: Normal skin color and thickness.  No translucency.  No bruising.  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  Bifid uvula  Family history of sudden death    As we discussed, her father's death was sudden on after a lung biopsy procedure, the details remain unknown.  The only early death of her family that is considered unexplained is her paternal grandmother who reportedly had heart attacks since her 40s.  While both maternal grandparents  in their 40s, their death is explained (cancer and congenital heart disease).  Nonetheless, with multiple aneurysms detected and bifid uvula seen on exam, we will refer her to medical genetics for connective tissue disease/aortopathy testing.    She is currently recommended to proceed with angiogram of the brain to further evaluate the intracranial aneurysms and the potential wall enhancement seen around one of her aneurysms.  With this not considered lifesaving, should hold off until her genetic testing is complete.  This is typically more of concern with vascular EDS that can lead to catastrophic vascular complications.      Recommendations:  Referral to medical genetics for connective tissue disease and aortopathy panel ruling out lyubov  Loeys-Kuldeep Syndrome and vascular EDS.   Would hold off on any phase of procedures until vascular EDS is specifically ruled out unless deemed to be lifesaving.  For the small splenic artery dilatations, we will plan for repeat CTA in 6 months  expected around August 17, 2025.    It was a pleasure meeting Ms. Reyer and her  in our clinic today.    Layne Kaufman MD, [facility]  Vascular Medicine  May 23, 2025    The longitudinal plan of care for the diagnosis(es)/condition(s) as documented were addressed during this visit. Due to the added complexity in care, I will continue to support Hiram in the subsequent management and with ongoing continuity of care.    I spent 100 minutes on the date of the encounter doing chart review/review of outside records/review of test results/interpretation of tests/patient visit/documentation and discussion with family.

## 2025-05-23 NOTE — LETTER
5/23/2025       RE: Chandra L Reyer  [address] [place] [address]     Dear Colleague,    Thank you for referring your patient, Chandra L Reyer, to the [facility] VASCULAR CLINIC [place] [facility]. Please see a copy of my visit note below.    VASCULAR MEDICINE CONSULT NOTE          LOCATION:  [facility]      Date of Service: 5/23/2025      Primary Care Provider: Mara Crowder  Referring provider;  Lisha Cloe*      Reason for the visit/chief complaint:   Multiple aneurysms      HPI:  Chandra L Reyer is a pleasant 50 year old female who presents to our Vascular Medicine clinic for the above mentioned reason.  She is accompanied by her  Aristeo.    Ms. Reyer has medical history significant for alopecia areata and well-controlled type 2 diabetes mellitus.    She has been having intermittent left sided (face, arm, and leg) numbness for few years now since at least 2021.  She was previously evaluated by outside neurology but recently referred to [facility] with neurology for second opinion.  She saw Dr. Mejia 10/31/2024.  It is of note that her previous outside MRI showed bilateral chronic cerebellar infarcts and white matter disease that neurology felt to be significant for her age.  Neurology then ordered CTA head and neck for stroke workup that was completed 2/4/2025 at did not necessarily show significant large vessel disease or stenosis however came back with multiple findings mainly intracranial aneurysms including:  Right cavernous ICA aneurysm 2 mm  Right ophthalmic ICA 1.3 mm x 2.1 mm   Anteriorly oriented aneurysm in close proximity to the takeoff of the left ophthalmic artery measuring 3.5 x 3.4 mm     She did complete her stroke workup with transthoracic echocardiogram with bubble study (not completed).  Her diabetes has been under very good control with A1c  consistently less than 7%.  LDL was noted to be less than 70.  She has been started on aspirin 81 mg for stroke secondary prevention.    She was subsequently referred to neurosurgery for her intracranial aneurysms and rheumatology given concern for vasculitis.    She was seen by rheumatology on 2025.  She was found to have new onset Raynaud's and dilated nailfold capillary loops on exam.  Other than this and positive WAI with 1: 320 dense fine speckled, multiple serological vasculitis workup came back unrevealing (Neg/normal ESR, CRP, ANCA, MPO, PR3, Chaney, RNP, SSA, SSB, dsDNA, Mirna 1, SCL-70, centromere,  C3, C4, cryoglobulin).    CTA chest abdomen pelvis was completed 2025 and this came back showin mm nubbin proximal to the left subclavian origin   Small saccular dilatation of the splenic artery  5.3 x 4.8 x 5.3 mm and 5.3 x 4.8 x 4.8 mm.  No other aneurysms or dissection noted.    PET scan was considered by rheumatology however was denied by insurance.  With vasculitis felt unlikely, she was referred to our clinic by rheumatology for connective tissue disease workup.    She was seen by neurosurgery on 2025.  She was recommended to proceed with diagnostic angiogram for better evaluation.  Patient however was hesitant and apprehensive about this procedure and concerned (given her father history of passing away after long biopsy procedure).    MRA head was completed last month 4/3/2025 and this confirmed no evidence of vasculitis.  There was concern for vessel wall enhancement indicating wall inflammation and increasing risk of complication on one of her aneurysm that is arising from left  para ophthalmic internal carotid artery that measures 3.1 x 5.5 x 1 mm.  This was also noted to exert compressive mass effect on the left prechiasmatic optic nerve.  The other cavernous segment ICA saccular aneurysm was stable when compared to her CTA 2 months prior.  On further follow-up with neurology earlier  "this month, she was referred to neuro-ophthalmology for further evaluation.  This is not yet completed.      Today, Hiram confirms the above history.        We verified her family history as follows:    No family history of aneurysms, dissections that she is aware of.    Father: Was a smoker.  He was diagnosed with pulmonary fibrosis in .  He passed away suddenly and quickly after what appears to be \" simple\" lung biopsy.  The details about the complications are unknown.  Paternal grandmother had reportedly heart attacks since early 40s and  in her early 60s.  Paternal grandfather  in his 80s (natural death).  Maternal grandmother had rheumatoid arthritis ( from cancer in her 40s).  Maternal grandfather  in his mid 40s too but previously had known congenital heart disease (was previously told that he will not make it past 25).    She is the oldest, has 3 full siblings and 1 half sister.     Obstetric history: She had one pregnancy with only complication being gestational diabetes.  19-year-old college daughter who is otherwise     Surgical history: Ms. Reyer never had any procedure or surgeries done in the past.    Social history: Never smoked.  Denies illicit drug use.    She has [place] ethnicity.    Denies history of lens dislocation, abnormal eye-width.  Denies joint dislocation, hypermobility, skin translucency, abnormal wound healing, easy bruisability, prolapse or hernias.  Denies chest wall deformities, dental crowding, scoliosis, spine instability or long fingers and toes.  No history of organ rupture.        REVIEW OF SYSTEMS:    A 12 point ROS was reviewed and is negative except what is mentioned in HPI.       Past medical history, surgical history, medications, family history, social history and allergies were reviewed. Pertinent points mentioned under HPI.    OBJECTIVE:    Vital signs:  There were no vitals taken for this visit.  Wt Readings from Last 1 Encounters: "   25 132 lb     There is no height or weight on file to calculate BMI.    Physical exam:  General appearance: Pleasant female in no apparent distress.    HEENT: NC/AT.  Bifid uvula.  Neck: Carotids +2/2 bilaterally without bruits.  No jugular venous distension.   Heart: RRR. Normal S1, S2. No murmur, rub, click, or gallop.   Chest: Clear to auscultation bilaterally.  No pectus deformities.  Abdomen: Soft, nontender, nondistended. No pulsatile mass.  No bruits.   Extremities: Normal fingers and toes.    Skin: Normal skin color and thickness.  No translucency.  No bruising.  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  Bifid uvula  Family history of sudden death    As we discussed, her father's death was sudden on after a lung biopsy procedure, the details remain unknown.  The only early death of her family that is considered unexplained is her paternal grandmother who reportedly had heart attacks since her 40s.  While both maternal grandparents  in their 40s, their death is explained (cancer and congenital heart disease).  Nonetheless, with multiple aneurysms detected and bifid uvula seen on exam, we will refer her to medical genetics for connective tissue disease/aortopathy testing.    She is currently recommended to proceed with angiogram of the brain to further evaluate the intracranial aneurysms and the potential wall enhancement seen around one of her aneurysms.  With this not considered lifesaving, should hold off until her genetic testing is complete.  This is typically more of concern with vascular EDS that can lead to catastrophic vascular complications.      Recommendations:  Referral to medical genetics for  connective tissue disease and aortopathy panel ruling out lyubov  Loeys-Kuldeep Syndrome and vascular EDS.   Would hold off on any phase of procedures until vascular EDS is specifically ruled out unless deemed to be lifesaving.  For the small splenic artery dilatations, we will plan for repeat CTA in 6 months expected around August 17, 2025.    It was a pleasure meeting Ms. Reyer and her  in our clinic today.    Layne Kaufman MD, [facility]  Vascular Medicine  May 23, 2025    The longitudinal plan of care for the diagnosis(es)/condition(s) as documented were addressed during this visit. Due to the added complexity in care, I will continue to support Hiram in the subsequent management and with ongoing continuity of care.    I spent 100 minutes on the date of the encounter doing chart review/review of outside records/review of test results/interpretation of tests/patient visit/documentation and discussion with family.      Again, thank you for allowing me to participate in the care of your patient.      Sincerely,    Layne Kaufman MD

## 2025-05-29 ENCOUNTER — TELEPHONE (OUTPATIENT)
Dept: VASCULAR SURGERY | Facility: CLINIC | Age: 50
End: 2025-05-29
Payer: COMMERCIAL

## 2025-05-29 NOTE — TELEPHONE ENCOUNTER
Left Voicemail (1st Attempt) and Sent Mychart (1st Attempt) for the patient to call back and schedule the following:    Location: [facility] [place] [facility]  Provider: Dr. Layne Kaufman  Appt type: Return Vascular Patient  Appt mode: In Person or Virtual Visit  Return date: Approx. August 2025  Appt notes: Follow up multiple aneurysm    Specialty phone number:  [phone] or [phone]    Is Imaging or labs needed?: Yes, CTA    Additional Notes: Please warm transfer to Imaging ([phone]).

## 2025-06-11 ENCOUNTER — TELEPHONE (OUTPATIENT)
Dept: CONSULT | Facility: CLINIC | Age: 50
End: 2025-06-11
Payer: COMMERCIAL

## 2025-06-11 NOTE — TELEPHONE ENCOUNTER
LVM for patient to call back to schedule GC only visit with any GC. My direct number provided. Will also send message via [employer].    Will also schedule next available GC/MD visit.

## 2025-06-26 ENCOUNTER — OFFICE VISIT (OUTPATIENT)
Dept: OPHTHALMOLOGY | Facility: CLINIC | Age: 50
End: 2025-06-26
Attending: PSYCHIATRY & NEUROLOGY
Payer: COMMERCIAL

## 2025-06-26 DIAGNOSIS — H53.10 SUBJECTIVE VISUAL DISTURBANCE: Primary | ICD-10-CM

## 2025-06-26 DIAGNOSIS — I67.1 INTRACRANIAL ANEURYSM: ICD-10-CM

## 2025-06-26 DIAGNOSIS — R93.0 ABNORMAL MRI OF THE HEAD: ICD-10-CM

## 2025-06-26 PROCEDURE — 99204 OFFICE O/P NEW MOD 45 MIN: CPT | Mod: GC | Performed by: OPHTHALMOLOGY

## 2025-06-26 PROCEDURE — 99214 OFFICE O/P EST MOD 30 MIN: CPT | Performed by: OPHTHALMOLOGY

## 2025-06-26 PROCEDURE — 92133 CPTRZD OPH DX IMG PST SGM ON: CPT | Performed by: OPHTHALMOLOGY

## 2025-06-26 PROCEDURE — 92083 EXTENDED VISUAL FIELD XM: CPT | Performed by: OPHTHALMOLOGY

## 2025-06-26 ASSESSMENT — VISUAL ACUITY
OD_CC: 20/20
METHOD: SNELLEN - LINEAR
OS_CC: 20/20
CORRECTION_TYPE: GLASSES

## 2025-06-26 ASSESSMENT — CUP TO DISC RATIO
OS_RATIO: 0.5
OD_RATIO: 0.5

## 2025-06-26 ASSESSMENT — CONF VISUAL FIELD
OD_SUPERIOR_TEMPORAL_RESTRICTION: 0
OS_INFERIOR_TEMPORAL_RESTRICTION: 0
OD_INFERIOR_NASAL_RESTRICTION: 0
OS_SUPERIOR_NASAL_RESTRICTION: 0
OS_INFERIOR_NASAL_RESTRICTION: 0
OS_SUPERIOR_TEMPORAL_RESTRICTION: 0
OS_NORMAL: 1
METHOD: COUNTING FINGERS
OD_INFERIOR_TEMPORAL_RESTRICTION: 0
OD_NORMAL: 1
OD_SUPERIOR_NASAL_RESTRICTION: 0

## 2025-06-26 ASSESSMENT — REFRACTION_WEARINGRX
SPECS_TYPE: SVL
OD_SPHERE: -4.50
OS_SPHERE: -5.25
OS_CYLINDER: SPHERE
OD_CYLINDER: SPHERE

## 2025-06-26 ASSESSMENT — EXTERNAL EXAM - LEFT EYE: OS_EXAM: NORMAL

## 2025-06-26 ASSESSMENT — EXTERNAL EXAM - RIGHT EYE: OD_EXAM: NORMAL

## 2025-06-26 ASSESSMENT — TONOMETRY
OD_IOP_MMHG: 08
IOP_METHOD: ICARE
OS_IOP_MMHG: 06

## 2025-06-26 ASSESSMENT — SLIT LAMP EXAM - LIDS
COMMENTS: NORMAL
COMMENTS: NORMAL

## 2025-06-26 NOTE — PROGRESS NOTES
1. Para-Ophthalmic ICA Aneurysm, left    No vision loss in the left eye related to the aneurysm. Recommend patient proceed with genetic testing and then assuming it is safe, with catheter angiography and discussion of risks, benefits, and alternatives of aneurysm repair with Dr. Kang.    Chandra L Reyer is a pleasant 50 year old  female who presents to my neuro-ophthalmology clinic today having been referred by Dr. Robertson for evaluation of aneurysms.     HPI:    58 F pmhx migraine with aura presents with intermittent left sided numbness and imaging findings of left paraophthalmic ICA aneurysm.     She got the Moderna booster in 2021 and several hours later had developed numbness and tingling of her left sided face, arm, and leg. Workup demonstrated mutlfocal small aneurysms with enhancement. Rheumatologic workup was unremarkable for vasculitis.     She notes occasional orthostasis but denies dizziness or lightheadedness. She has mild imbalance with inner ear fullness due to allergies annually and is experiencing this but not to a greater extent than usual. She has sinus headaches occasionally with allergic symptoms. Otherwise, denies difficulty walking, word finding difficulty, or other focal neurologic deficits.    Denies changes in vision, double vision, flashes, floaters, curtain-like defects, and pain with eye movements.     Pohx:   - Myopia   - no surgical history    The patient is presenting with an acute illness that potentially poses a threat to life or bodily function (vision).    Independent historians:  Patient    Review of outside testing:    MRA Brain 4/3/25:  Impression:    1. No evidence to suggest vasculitis.  2. Apically directed intradural saccular aneurysm arising from left para ophthalmic internal carotid artery that measures 3.1 mm in maximal diameter and 5.5 mm in length with a neck diameter of  approximately 1 mm. Notably, this aneurysm demonstrates vessel wall enhancement on  postcontrast images which indicates wall inflammation and increases risk of complication. Additionally this aneurysm exerts compressive mass effect on the left prechiasmatic optic nerve.  3. The previously described extradural saccular aneurysms arising from  right internal carotid artery cavernous segment is similar in size to  prior CTA.     Laboratory Values:   Normal inflammatory markers, negative ANCA's, negative YARA's, negative cryoglobulins, and negative SCL-70, Mirna 1, and centromere. Workup so far is not indicative of a vasculitis. Referred to vascular     My interpretation performed today of outside testing:  I have independently reviewed *** performed ***. ***No abnormal FLAIR hyperintensity or enhancement in the orbits or elsewhere along the visual pathways.    Review of outside clinical notes:  May 23, 2025-vascular medicine-Dr. 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  Bifid uvula  Family history of sudden death     As we discussed, her father's death was sudden on after a lung biopsy procedure, the details remain unknown.  The only early death of her family that is considered unexplained is her paternal grandmother who reportedly had heart attacks since her 40s.  While both maternal grandparents  in their 40s, their death is explained (cancer and congenital heart disease).  Nonetheless, with multiple aneurysms detected and bifid uvula seen on exam, we will refer her to medical genetics for connective tissue disease/aortopathy testing.     She is currently recommended to proceed with angiogram of the brain to further evaluate the intracranial aneurysms and the potential wall enhancement seen around one of her aneurysms.  With this not considered lifesaving, should hold off until her genetic testing is complete.  This is typically more of  concern with vascular EDS that can lead to catastrophic vascular complications.        Recommendations:  Referral to medical genetics for connective tissue disease and aortopathy panel ruling out lyubov  Loeys-Kuldeep Syndrome and vascular EDS.   Would hold off on any phase of procedures until vascular EDS is specifically ruled out unless deemed to be lifesaving.  For the small splenic artery dilatations, we will plan for repeat CTA in 6 months expected around August 17, 2025.     It was a pleasure meeting Ms. Reyer and her  in our clinic today.      5/8/25 -- Visit with Dr. Robertson  Assessment/Plan:   Chandra L Reyer is a 50 year old year old female who presents for routine follow-up concerning intermittent left sided (face, arm, and leg) numbness.  Neurologic exam is largely unremarkable. Reviewed her MRIs. Counseled patient that I did not see anything on her imaging,nor could I think of any neurologic disorder, that can readily explain her symptoms.  I am uncertain what further testing might provide an etiology. She does not really have pain associated so neuropathic pain agents unlikely to be that beneficial.       Her MRI does show a rather significant white matter disease burden, especially for her age and relatively few cardiovascular risk factors.  Also, surprisingly, she has evidence for bilateral chronic cerebellar infarcts.  Obtained CTA H and N for stroke workup which did not show significant LVO or stenosis; however, appreciated multiple intracranial aneurysms. She saw rheumatology to evaluate for a possible vasculopathy/vasculitis as the cause but workup thus far has not supported this diagnosis.  Was referred to vascular and has this appointment coming up.  Saw neuro IR and traditional angiogram was recommended. She is apprehensive about this due to risks and family history of complications with procedures.  MRA obtained does show that  left para ophthalmic internal carotid artery aneurysm has mass  effect on the left prechiasmatic optic nerve. Will refer to neuro-ophthalmology for evaluation. Encouraged follow-up with neuro-IR pending evaluation with vascular.      TTE ordered, will need to schedule. LDL 69 and A1c 6.3%.  Recommended ASA 81 mg for secondary stroke prevention.      Intermittent left sided numbness/tingling   Incidental bilateral cerebellar infarcts   Incidental multiple intracranial aneurysms      Plan  > Refer to neuro-ophthalmology   > TTE with bubble   > Start ASA 81 mg daily   > LDL at goal (40-70)  > Follow-up in 3 months   Past medical history:    Patient Active Problem List   Diagnosis    Intracranial aneurysm    Raynaud's disease without gangrene     Patient has a current medication list which includes the following prescription(s): aspirin, loratadine-pseudoephedrine, metformin, norethindrone, and vitamin b-12.. List is confirmed today.    Family history:  Mother: wet macular degeneration, stroke, T2DM  Father, paternal grandmother: CAD and MI      Social history:  Patient  reports that she has never smoked. She has never used smokeless tobacco. She reports that she does not currently use alcohol.     Occupation: [employer]    Exam:    Visual acuity 20/20 right eye 20/20 left eye.  Color vision 11/11 right eye and 11/11 left eye.  Pupils PERRLA. Intraocular pressure 08 right eye and 06 left eye.  Anterior segment exam uremarkable.  Fundus exam unremarkable.  Strabismus exam full / ortho / painless.    Remaining cranial nerve exam (V, VII-XII): V1-V3 sensation intact and equal bilaterally. Muscles of facial expression symmetric and no facial droop. Hearing intact to conversational tone. Palate elevates symmetrically. Tongue protrudes midline and side-to-side. Shoulder elevation and sternocleidomastoid strength intact.     Tests ordered and interpreted today:    OCT RNFL:  Right eye: reliable, mean thickness 96  Left eye: reliable, mean thickness 89      GTOP Visual Field:  In the  right eye: reliable test; few inferior nonspecific deficits; normal  In the left eye:  reliable test; few nonspecific deficits; normal    Discussion of management / interpretation with another provider: None               Leland Dowd MD  PGY-3, Ophthalmology  [facility]

## 2025-06-26 NOTE — LETTER
2025    RE: Chandra L Reyer  : 1975  MRN: [id number]    Dear Dr. Robertson    Thank you for referring your patient, Chandra L Reyer, to my neuro-ophthalmology clinic recently.  After a thorough neuro-ophthalmic history and examination, I came to the following conclusions:     1. Left para-ophthalmic ICA Aneurysm    No evidence of vision loss in the left eye related to the aneurysm despite close proximity of the aneurysm to the left optic nerve.  Recommend patient proceed with genetic testing and then assuming it is deemed safe by her  and neurosurgeon.... then proceeding with catheter angiography and discussion of risks, benefits, and alternatives of aneurysm repair with Dr. Kang.    Chandra L Reyer is a pleasant 50 year old  female who presents to my neuro-ophthalmology clinic today having been referred by Dr. Robertson for evaluation of aneurysms.     HPI:    58 F pmhx migraine with aura presents with intermittent left sided numbness and imaging findings of left paraophthalmic ICA aneurysm.     She got the Moderna booster in  and several hours later had developed numbness and tingling of her left sided face, arm, and leg. Workup demonstrated mutlfocal small aneurysms with enhancement. Rheumatologic workup was unremarkable for vasculitis.     She notes occasional orthostasis but denies dizziness or lightheadedness. She has mild imbalance with inner ear fullness due to allergies annually and is experiencing this but not to a greater extent than usual. She has sinus headaches occasionally with allergic symptoms. Otherwise, denies difficulty walking, word finding difficulty, or other focal neurologic deficits.    Denies changes in vision, double vision, flashes, floaters, curtain-like defects, and pain with eye movements.     Pohx:   - Myopia   - no surgical history    Independent historians:  Patient    Review of outside testing:    MRA Brain 4/3/25:  Impression:    1. No evidence to  suggest vasculitis.  2. Apically directed intradural saccular aneurysm arising from left para ophthalmic internal carotid artery that measures 3.1 mm in maximal diameter and 5.5 mm in length with a neck diameter of  approximately 1 mm. Notably, this aneurysm demonstrates vessel wall enhancement on postcontrast images which indicates wall inflammation and increases risk of complication. Additionally this aneurysm exerts compressive mass effect on the left prechiasmatic optic nerve.  3. The previously described extradural saccular aneurysms arising from  right internal carotid artery cavernous segment is similar in size to  prior CTA.     Laboratory Values:   Normal inflammatory markers, negative ANCA's, negative YARA's, negative cryoglobulins, and negative SCL-70, Mirna 1, and centromere. Workup so far is not indicative of a vasculitis. Referred to vascular     My interpretation performed today of outside testing:  I have independently reviewed the MR from 2025 and agree that the aneurysm approximates the left optic nerve and could potentially cause an optic neuropathy (but clinic exam does not support this).    Review of outside clinical notes:  May 23, 2025-vascular medicine-Dr. 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  Bifid uvula  Family history of sudden death     As we discussed, her father's death was sudden on after a lung biopsy procedure, the details remain unknown.  The only early death of her family that is considered unexplained is her paternal grandmother who reportedly had heart attacks since her 40s.  While both maternal grandparents  in their 40s, their death is explained (cancer and congenital heart disease).  Nonetheless, with multiple aneurysms detected and bifid uvula seen on exam, we will refer her to medical genetics for connective  tissue disease/aortopathy testing.     She is currently recommended to proceed with angiogram of the brain to further evaluate the intracranial aneurysms and the potential wall enhancement seen around one of her aneurysms.  With this not considered lifesaving, should hold off until her genetic testing is complete.  This is typically more of concern with vascular EDS that can lead to catastrophic vascular complications.        Recommendations:  Referral to medical genetics for connective tissue disease and aortopathy panel ruling out lyubov  Loeys-Kuldeep Syndrome and vascular EDS.   Would hold off on any phase of procedures until vascular EDS is specifically ruled out unless deemed to be lifesaving.  For the small splenic artery dilatations, we will plan for repeat CTA in 6 months expected around August 17, 2025.     It was a pleasure meeting Ms. Reyer and her  in our clinic today.      5/8/25 -- Visit with Dr. Robertson  Assessment/Plan:   Chandra L Reyer is a 50 year old year old female who presents for routine follow-up concerning intermittent left sided (face, arm, and leg) numbness.  Neurologic exam is largely unremarkable. Reviewed her MRIs. Counseled patient that I did not see anything on her imaging,nor could I think of any neurologic disorder, that can readily explain her symptoms.  I am uncertain what further testing might provide an etiology. She does not really have pain associated so neuropathic pain agents unlikely to be that beneficial.       Her MRI does show a rather significant white matter disease burden, especially for her age and relatively few cardiovascular risk factors.  Also, surprisingly, she has evidence for bilateral chronic cerebellar infarcts.  Obtained CTA H and N for stroke workup which did not show significant LVO or stenosis; however, appreciated multiple intracranial aneurysms. She saw rheumatology to evaluate for a possible vasculopathy/vasculitis as the cause but workup thus far  has not supported this diagnosis.  Was referred to vascular and has this appointment coming up.  Saw neuro IR and traditional angiogram was recommended. She is apprehensive about this due to risks and family history of complications with procedures.  MRA obtained does show that  left para ophthalmic internal carotid artery aneurysm has mass effect on the left prechiasmatic optic nerve. Will refer to neuro-ophthalmology for evaluation. Encouraged follow-up with neuro-IR pending evaluation with vascular.      TTE ordered, will need to schedule. LDL 69 and A1c 6.3%.  Recommended ASA 81 mg for secondary stroke prevention.      Intermittent left sided numbness/tingling   Incidental bilateral cerebellar infarcts   Incidental multiple intracranial aneurysms      Plan  > Refer to neuro-ophthalmology   > TTE with bubble   > Start ASA 81 mg daily   > LDL at goal (40-70)  > Follow-up in 3 months   Past medical history:    Patient Active Problem List   Diagnosis    Intracranial aneurysm    Raynaud's disease without gangrene     Patient has a current medication list which includes the following prescription(s): aspirin, loratadine-pseudoephedrine, metformin, norethindrone, and vitamin b-12.. List is confirmed today.    Family history:  Mother: wet macular degeneration, stroke, T2DM  Father, paternal grandmother: CAD and MI    Social history:  Patient  reports that she has never smoked. She has never used smokeless tobacco. She reports that she does not currently use alcohol.     Occupation: freelance artist    Exam:    Visual acuity 20/20 right eye 20/20 left eye.  Color vision 11/11 right eye and 11/11 left eye.  Pupils were normal without afferent pupillary defect. Intraocular pressure 08 right eye and 06 left eye.  Anterior segment exam uremarkable.  Fundus exam unremarkable including healthy optic nerve heads bilaterally.    Tests ordered and interpreted today:    OCT RNFL:  Right eye: reliable, mean thickness 96  Left eye:  reliable, mean thickness 89 - normal compared to age-matched controls      Automated 24-2 static perimetry:   In the right eye: reliable test; full  In the left eye:  reliable test; full       Again, thank you for trusting me with the care of your patient.  For further exam details, please feel free to contact our office for additional records.  If you wish to contact me regarding this patient please email me at [email or web address] or give my clinic a call to arrange a phone conversation.    Sincerely,    Carlos Ward MD  , Neuro-Ophthalmology and Adult Strabismus Surgery  [facility] in Neuro-Ophthalmology  Department of Ophthalmology and Visual Neurosciences  [facility]

## 2025-07-02 NOTE — PROGRESS NOTES
Virtual Visit Details  Type of service:  Video Visit   Originating Location (pt. Location): Home  Distant Location (provider location):  Off-site  Platform used for Video Visit: Mariza     Name:  Reyer, Chandra  :   1975  MRN:   [id number]  Date of service: Jul 3, 2025  Primary Provider: Mara Crowder  Referring Provider: Layne Kaufman    Presenting Information:  Hiram, a 50 year old female, was seen was seen for a video visit at the [facility] for evaluation of multiple aneurysms. We met with Hiram to obtain a family history, to discuss possible genetic contributions to her symptoms, and to obtain informed consent for genetic testing.       Personal History:   Hiram has a history of intermittent left sided (face, arm, and leg) numbness since at least . Previous outside MRI showed bilateral chronic cerebellar infarcts and white matter disease that neurology felt to be significant for her age. Neurology then ordered CTA head and neck for stroke workup that was completed 2025 at did not necessarily show significant large vessel disease or stenosis however came back with multiple findings mainly intracranial aneurysms including:  Right cavernous ICA aneurysm 2 mm  Right ophthalmic ICA 1.3 mm x 2.1 mm   Anteriorly oriented aneurysm in close proximity to the takeoff of the left ophthalmic artery measuring 3.5 x 3.4 mm     CTA chest abdomen pelvis was completed 2025 and this came back showing multiple aneurysms:   Aortic arch: 4 mm nubbin proximal to the left subclavian origin.  Mid splenic artery: 5.3 x 4.8 x 5.3 mm saccular aneurysm.   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 She denies a history of retinal detachment, pneumothorax, scoliosis, other skeletal concerns, organ rupture, or joint hypermobility.    Patient Active Problem List   Diagnosis     Intracranial aneurysm    Raynaud's disease without gangrene     Past Medical History:   Diagnosis Date    Alopecia     Diabetes mellitus, type 2 (H)        Family History:   A three generation pedigree was obtained today. A copy is located in the media tab and full details are available in the pedigree section of the history tab. The following information was provided:     Children:   Daughter (19y) was diagnosed with seizure disorder at age 3.   Siblings:  Brother (49y) has hypertension.  Sister (46y) has seizure disorder (diagnosed mid-30s), thyroid disease, and type 2 diabetes.  Nephew has ADHD, autism spectrum disorder, and has surgery for craniosynostosis.   Brother (45y) is well.  Paternal half-sister (23y) has depression and anxiety.  Maternal Relatives:  Mother (76y) has type 2 diabetes.  Uncle passed away at age 60. He had a history of type 2 diabetes, hypothyroidism, and had a kidney transplant in his 50s. He had exposure to agent orange.   Uncle (70) has hypothyroidism.  Cousin has a history of hyperthyroidism which is now hypothyroidism due to treatment.  Aunt does not have much contact with the family.  Cousin with possible hypothyroidism.   Grandmother passed away at age 40. She had rheumatoid arthritis and multiple myeloma due to her RA medications.   Grandfather passed away at age 49. He had atrial septal defect and was not expected to live as long as she did.    Paternal Relatives:  Father passed away at age 56 due to a reaction during a lung biopsy procedure. He had pulmonary fibrosis.   Limited health information is available about Hiram's aunts and uncles.   Grandmother passed away at age 65. She had type 2 diabetes, hypertension, hyperlipidemia, and multiple heart attacks and bypass surgeries.   Grandfather passed away at age 86.     The family history is otherwise negative for aneurysm, high degree of myopia, ectopia lentis, retinal detachment, pneumothorax, scoliosis, other skeletal  concerns, organ rupture, sudden death, and known genetic disorders. Consanguinity is denied.    Pedigree Image      Discussion and Assessment:  Genes are long stretches of DNA that are responsible for how our bodies look and how our bodies work. Our genes are inherited on structures called chromosomes of which we have 23 pairs. One copy of each chromosome in a pair is inherited from the mother and one is inherited from the father. Changes in the DNA sequence of a gene, called variants, as well as changes within the chromosomes can cause the signs and symptoms of a genetic condition.     Several connective tissues disorders conditions cause an increased risk for aneurysms including Loeys-Kuldeep syndrome, Silverio-Danlos syndrome, and Marfan syndrome. Connective tissue is involved in supporting many structures of the body including the skin, skeleton, blood vessels, and other organs. Symptoms of these conditions can be highly variable, meaning that not everyone with a connective tissue disorder will show the same signs or symptoms of the condition. Some individuals may have minimal symptoms, such as loose joints, while others may have life-threatening concerns due to the condition. Based on Hiram's personal history of multiple aneurysms, genetic testing for these conditions and related disorders is recommended. This testing will analyze 29 genes associated with these conditions. The potential results were discussed including a positive, negative, or variant of uncertain significance.     One possibility is a change(s) could be seen in Hiram and this change(s) is known to cause similar symptoms to the symptoms Hiram has experienced.  This is considered a positive result.  A positive result may provide more information on appropriate clinical management for Hiram and may provide information on additional potential health risks associated with Hiram's diagnosis.  A positive result can also have implications for  the health and reproductive risks of other relatives.  It is also possible that no change(s) that are likely to explain Hiram's symptoms are found.  This is considered a negative result.  A negative result would not completely rule out a possible genetic cause for Hiram's symptoms.  Not all changes in our genes cause disease.  Sometimes, it can be difficult for the laboratory to determine whether or not a change that is found contributes to the patient's symptoms.  If the meaning of a particular gene change is unknown, the lab classifies the result as a variant of unknown significance (VUS). Follow-up testing of relatives may be beneficial in clarifying the meaning of this result.     It is medically necessary to determine if there is an underlying genetic cause for Hiram's symptoms for several reasons. First and foremost this can be important for her own health. It is possible that an underlying cause may also predispose Hiram to other health risks. Knowing about these additional health risks can help us stay ahead of Hiram's healthcare to more appropriately screen for other complications. This testing can also help [facility] vascular doctors make appropriate surgery recommendations for Hiram. Some diagnoses may also have treatment options. Additionally, discovering an underlying reason may help predict the chance for other family members to have similar healthcare needs. Finally, having a specific underlying diagnosis can sometimes help individuals receive the services they need to help reach their full potential in school, in work, or in day to day life.     The lab we are using for this test is called [facility]. They will send a buccal kit directly to [facility] who will then be instructed to collect the specimen and mail it back to the lab. This kit must be completed and appropriately labelled with name and date of birth.    Insurance and billing procedures were covered with [facility]. [facility] will be sent  a link where she can access [facility]'s billing policy and estimated out of pocket cost. Hiram's estimated out of pocket cost is $0. [facility] guarantees this amount so Hiram should not expect to receive a bill for this test.     Hiram provided informed consent for the testing. I will plan to follow-up with Hiram by phone when results are returned, approximately 2-3 weeks after the testing is initiated. Additional follow-up recommendations will be discussed at Hiram's appointment with Dr. Mckee in October. Additional questions or concerns were denied at this time.        Plan:  A buccal swab kit will be sent directly to Hiram from the laboratory.    Once the lab receives the sample, Hiram's Aortopathy Comprehensive Panel will be initiated.    Results are expected in approximately 2-3 weeks and will be returned by phone. A follow-up appointment will be scheduled as needed at that time.    Contact information was provided should any questions arise in the future.       Kyra Castro [facility]  Genetic Counselor  [facility]   Phone: [phone]    This visit was co-counseled by Genetic Counseling Student, Yojana Mckenzie.      60 minutes spent on the date of the encounter in chart review, patient visit, test coordination/review, documentation, and/or discussion with other providers about the concerns documented above.

## 2025-07-03 ENCOUNTER — VIRTUAL VISIT (OUTPATIENT)
Dept: CONSULT | Facility: CLINIC | Age: 50
End: 2025-07-03
Attending: HOSPITALIST
Payer: COMMERCIAL

## 2025-07-03 DIAGNOSIS — Q35.7 BIFID UVULA: ICD-10-CM

## 2025-07-03 DIAGNOSIS — H52.13 MYOPIA, BILATERAL: ICD-10-CM

## 2025-07-03 DIAGNOSIS — I67.1 INTRACRANIAL ANEURYSM: ICD-10-CM

## 2025-07-03 DIAGNOSIS — I72.8 SPLENIC ARTERY ANEURYSM: ICD-10-CM

## 2025-07-03 DIAGNOSIS — Z13.71 ENCOUNTER FOR NONPROCREATIVE GENETIC COUNSELING AND TESTING: Primary | ICD-10-CM

## 2025-07-03 DIAGNOSIS — Z71.83 ENCOUNTER FOR NONPROCREATIVE GENETIC COUNSELING AND TESTING: Primary | ICD-10-CM

## 2025-07-03 PROCEDURE — 96041 GENETIC COUNSELING SVC EA 30: CPT | Mod: GT,95 | Performed by: GENETIC COUNSELOR, MS

## 2025-07-03 NOTE — Clinical Note
7/3/2025      RE: Chandra L Reyer  [address] [place] [address]     Dear Colleague,    Thank you for the opportunity to participate in the care of your patient, Chandra L Reyer, at the [facility] at [facility]. Please see a copy of my visit note below.    Virtual Visit Details  Type of service:  Video Visit   Originating Location (pt. Location): Home  Distant Location (provider location):  Off-site  Platform used for Video Visit: Reebee     Name:  Reyer, Chandra  :   1975  MRN:   [id number]  Date of service: Jul 3, 2025  Primary Provider: Mara Crowder  Referring Provider: Layne Kaufman    Presenting Information:  Hiram, a 50 year old female, was seen was seen for a video visit at the [facility] for evaluation of multiple aneurysms. We met with Hiram to obtain a family history, to discuss possible genetic contributions to her symptoms, and to obtain informed consent for genetic testing.       Personal History:   Hiram has a history of intermittent left sided (face, arm, and leg) numbness since at least . Previous outside MRI showed bilateral chronic cerebellar infarcts and white matter disease that neurology felt to be significant for her age. Neurology then ordered CTA head and neck for stroke workup that was completed 2025 at did not necessarily show significant large vessel disease or stenosis however came back with multiple findings mainly intracranial aneurysms including:  Right cavernous ICA aneurysm 2 mm  Right ophthalmic ICA 1.3 mm x 2.1 mm   Anteriorly oriented aneurysm in close proximity to the takeoff of the left ophthalmic artery measuring 3.5 x 3.4 mm     CTA chest abdomen pelvis was completed 2025 and this came back showing multiple aneurysms:   Aortic arch: 4 mm nubbin proximal to the left subclavian origin.  Mid splenic artery: 5.3 x 4.8 x 5.3 mm  saccular aneurysm.   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 She denies a history of retinal detachment, pneumothorax, scoliosis, other skeletal concerns, organ rupture, or joint hypermobility.    Patient Active Problem List   Diagnosis    Intracranial aneurysm    Raynaud's disease without gangrene     Past Medical History:   Diagnosis Date    Alopecia     Diabetes mellitus, type 2 (H)        Family History:   A three generation pedigree was obtained today. A copy is located in the media tab and full details are available in the pedigree section of the history tab. The following information was provided:     Children:   Daughter (19y) was diagnosed with seizure disorder at age 3.   Siblings:  Brother (49y) has hypertension.  Sister (46y) has seizure disorder (diagnosed mid-30s), thyroid disease, and type 2 diabetes.  Nephew has ADHD, autism spectrum disorder, and has surgery for craniosynostosis.   Brother (45y) is well.  Paternal half-sister (23y) has depression and anxiety.  Maternal Relatives:  Mother (76y) has type 2 diabetes.  Uncle passed away at age 60. He had a history of type 2 diabetes, hypothyroidism, and had a kidney transplant in his 50s. He had exposure to agent orange.   Uncle (70) has hypothyroidism.  Cousin has a history of hyperthyroidism which is now hypothyroidism due to treatment.  Aunt does not have much contact with the family.  Cousin with possible hypothyroidism.   Grandmother passed away at age 40. She had rheumatoid arthritis and multiple myeloma due to her RA medications.   Grandfather passed away at age 49. He had atrial septal defect and was not expected to live as long as she did.    Paternal Relatives:  Father passed away at age 56 due to a reaction during a lung biopsy procedure. He had pulmonary fibrosis.   Limited health information is available about Hiram's aunts and  uncles.   Grandmother passed away at age 65. She had type 2 diabetes, hypertension, hyperlipidemia, and multiple heart attacks and bypass surgeries.   Grandfather passed away at age 86.     The family history is otherwise negative for aneurysm, high degree of myopia, ectopia lentis, retinal detachment, pneumothorax, scoliosis, other skeletal concerns, organ rupture, sudden death, and known genetic disorders. Consanguinity is denied.    Pedigree Image      Discussion and Assessment:  Genes are long stretches of DNA that are responsible for how our bodies look and how our bodies work. Our genes are inherited on structures called chromosomes of which we have 23 pairs. One copy of each chromosome in a pair is inherited from the mother and one is inherited from the father. Changes in the DNA sequence of a gene, called variants, as well as changes within the chromosomes can cause the signs and symptoms of a genetic condition.     Several connective tissues disorders conditions cause an increased risk for aneurysms including Loeys-Kuldeep syndrome, Silverio-Danlos syndrome, and Marfan syndrome. Connective tissue is involved in supporting many structures of the body including the skin, skeleton, blood vessels, and other organs. Symptoms of these conditions can be highly variable, meaning that not everyone with a connective tissue disorder will show the same signs or symptoms of the condition. Some individuals may have minimal symptoms, such as loose joints, while others may have life-threatening concerns due to the condition. Based on Hiram's personal history of multiple aneurysms, genetic testing for these conditions and related disorders is recommended. This testing will analyze 29 genes associated with these conditions. The potential results were discussed including a positive, negative, or variant of uncertain significance.     One possibility is a change(s) could be seen in Hiram and this change(s) is known to cause  similar symptoms to the symptoms Hiram has experienced.  This is considered a positive result.  A positive result may provide more information on appropriate clinical management for Hiram and may provide information on additional potential health risks associated with Hiram's diagnosis.  A positive result can also have implications for the health and reproductive risks of other relatives.  It is also possible that no change(s) that are likely to explain Hiram's symptoms are found.  This is considered a negative result.  A negative result would not completely rule out a possible genetic cause for Hiram's symptoms.  Not all changes in our genes cause disease.  Sometimes, it can be difficult for the laboratory to determine whether or not a change that is found contributes to the patient's symptoms.  If the meaning of a particular gene change is unknown, the lab classifies the result as a variant of unknown significance (VUS). Follow-up testing of relatives may be beneficial in clarifying the meaning of this result.     It is medically necessary to determine if there is an underlying genetic cause for Hiram's symptoms for several reasons. First and foremost this can be important for her own health. It is possible that an underlying cause may also predispose Hiram to other health risks. Knowing about these additional health risks can help us stay ahead of Hiram's healthcare to more appropriately screen for other complications. This testing can also help [facility]'s vascular doctors make appropriate surgery recommendations for Hiram. Some diagnoses may also have treatment options. Additionally, discovering an underlying reason may help predict the chance for other family members to have similar healthcare needs. Finally, having a specific underlying diagnosis can sometimes help individuals receive the services they need to help reach their full potential in school, in work, or in day to day life.     The  lab we are using for this test is called [facility]. They will send a buccal kit directly to Hiram who will then be instructed to collect the specimen and mail it back to the lab. This kit must be completed and appropriately labelled with name and date of birth.    Insurance and billing procedures were covered with Hiram. Hiram will be sent a link where she can access [facility]'s billing policy and estimated out of pocket cost. Hriam's estimated out of pocket cost is $0. [facility] guarantees this amount so Hiram should not expect to receive a bill for this test.     Hiram provided informed consent for the testing. I will plan to follow-up with Hiram by phone when results are returned, approximately 2-3 weeks after the testing is initiated. Additional follow-up recommendations will be discussed at Hiram's appointment with Dr. Mckee in October. Additional questions or concerns were denied at this time.        Plan:  A buccal swab kit will be sent directly to Hiram from the laboratory.    Once the lab receives the sample, Hiram's Aortopathy Comprehensive Panel will be initiated.    Results are expected in approximately 2-3 weeks and will be returned by phone. A follow-up appointment will be scheduled as needed at that time.    Contact information was provided should any questions arise in the future.       Kyra Castro, [facility]  Genetic Counselor  [facility]   Phone: [phone]    This visit was co-counseled by Genetic Counseling Student, Yojana Mckenzie.      *** minutes spent on the date of the encounter in chart review, patient visit, test coordination/review, documentation, and/or discussion with other providers about the concerns documented above.       Please do not hesitate to contact me if you have any questions/concerns.     Sincerely,       Kyra Castro,

## 2025-08-11 ENCOUNTER — HOSPITAL ENCOUNTER (OUTPATIENT)
Dept: CT IMAGING | Facility: CLINIC | Age: 50
Discharge: HOME | End: 2025-08-11
Attending: HOSPITALIST | Admitting: HOSPITALIST
Payer: COMMERCIAL

## 2025-08-11 DIAGNOSIS — I72.8 SPLENIC ARTERY ANEURYSM: ICD-10-CM

## 2025-08-11 DIAGNOSIS — I67.1 INTRACRANIAL ANEURYSM: ICD-10-CM

## 2025-08-11 PROCEDURE — 75635 CT ANGIO ABDOMINAL ARTERIES: CPT | Mod: 26 | Performed by: RADIOLOGY

## 2025-08-11 PROCEDURE — 250N000011 HC RX IP 250 OP 636: Performed by: HOSPITALIST

## 2025-08-11 PROCEDURE — 71275 CT ANGIOGRAPHY CHEST: CPT

## 2025-08-11 PROCEDURE — 71275 CT ANGIOGRAPHY CHEST: CPT | Mod: 26 | Performed by: RADIOLOGY

## 2025-08-11 PROCEDURE — 250N000009 HC RX 250: Performed by: HOSPITALIST

## 2025-08-11 RX ORDER — IOPAMIDOL 755 MG/ML
120 INJECTION, SOLUTION INTRAVASCULAR ONCE
Status: COMPLETED | OUTPATIENT
Start: 2025-08-11 | End: 2025-08-11

## 2025-08-11 RX ADMIN — IOPAMIDOL 120 ML: 755 INJECTION, SOLUTION INTRAVENOUS at 10:12

## 2025-08-11 RX ADMIN — SODIUM CHLORIDE 85 ML: 9 INJECTION, SOLUTION INTRAVENOUS at 10:13

## 2025-08-14 ENCOUNTER — OFFICE VISIT (OUTPATIENT)
Dept: NEUROLOGY | Facility: CLINIC | Age: 50
End: 2025-08-14
Payer: COMMERCIAL

## 2025-08-14 VITALS — SYSTOLIC BLOOD PRESSURE: 119 MMHG | DIASTOLIC BLOOD PRESSURE: 77 MMHG | HEART RATE: 89 BPM | OXYGEN SATURATION: 99 %

## 2025-08-14 DIAGNOSIS — I63.9 CEREBROVASCULAR ACCIDENT (CVA), UNSPECIFIED MECHANISM (H): Primary | ICD-10-CM

## 2025-08-14 DIAGNOSIS — R20.0 HEMIANESTHESIA: ICD-10-CM

## 2025-08-15 ENCOUNTER — OFFICE VISIT (OUTPATIENT)
Dept: VASCULAR SURGERY | Facility: CLINIC | Age: 50
End: 2025-08-15
Payer: COMMERCIAL

## 2025-08-15 VITALS
HEART RATE: 80 BPM | SYSTOLIC BLOOD PRESSURE: 115 MMHG | DIASTOLIC BLOOD PRESSURE: 79 MMHG | BODY MASS INDEX: 20.77 KG/M2 | OXYGEN SATURATION: 97 % | WEIGHT: 128.7 LBS

## 2025-08-15 DIAGNOSIS — I72.8 SPLENIC ARTERY ANEURYSM: Primary | ICD-10-CM

## 2025-08-15 PROCEDURE — 99214 OFFICE O/P EST MOD 30 MIN: CPT | Performed by: HOSPITALIST

## 2025-08-15 PROCEDURE — G2211 COMPLEX E/M VISIT ADD ON: HCPCS | Performed by: HOSPITALIST

## 2025-08-15 PROCEDURE — 3078F DIAST BP <80 MM HG: CPT | Performed by: HOSPITALIST

## 2025-08-15 PROCEDURE — 1126F AMNT PAIN NOTED NONE PRSNT: CPT | Performed by: HOSPITALIST

## 2025-08-15 PROCEDURE — 3074F SYST BP LT 130 MM HG: CPT | Performed by: HOSPITALIST

## 2025-08-15 ASSESSMENT — PAIN SCALES - GENERAL: PAINLEVEL_OUTOF10: NO PAIN (0)
